# Patient Record
Sex: FEMALE | Race: AMERICAN INDIAN OR ALASKA NATIVE | Employment: UNEMPLOYED | URBAN - METROPOLITAN AREA
[De-identification: names, ages, dates, MRNs, and addresses within clinical notes are randomized per-mention and may not be internally consistent; named-entity substitution may affect disease eponyms.]

---

## 2017-12-23 ENCOUNTER — HOSPITAL ENCOUNTER (INPATIENT)
Facility: CLINIC | Age: 37
LOS: 3 days | Discharge: HOME | End: 2017-12-26
Attending: EMERGENCY MEDICINE | Admitting: PSYCHIATRY & NEUROLOGY
Payer: COMMERCIAL

## 2017-12-23 DIAGNOSIS — F10.20 UNCOMPLICATED ALCOHOL DEPENDENCE (H): ICD-10-CM

## 2017-12-23 DIAGNOSIS — G44.209 TENSION HEADACHE: Primary | ICD-10-CM

## 2017-12-23 DIAGNOSIS — K21.9 GASTROESOPHAGEAL REFLUX DISEASE WITHOUT ESOPHAGITIS: ICD-10-CM

## 2017-12-23 DIAGNOSIS — F41.1 GAD (GENERALIZED ANXIETY DISORDER): ICD-10-CM

## 2017-12-23 DIAGNOSIS — F13.20 BENZODIAZEPINE DEPENDENCE (H): ICD-10-CM

## 2017-12-23 DIAGNOSIS — M16.10 ARTHRITIS OF HIP: ICD-10-CM

## 2017-12-23 DIAGNOSIS — D50.8 IRON DEFICIENCY ANEMIA SECONDARY TO INADEQUATE DIETARY IRON INTAKE: ICD-10-CM

## 2017-12-23 PROBLEM — F19.10 POLYSUBSTANCE ABUSE (H): Status: ACTIVE | Noted: 2017-12-23

## 2017-12-23 LAB
ALCOHOL BREATH TEST: 0 (ref 0–0.01)
AMPHETAMINES UR QL SCN: NEGATIVE
BARBITURATES UR QL: NEGATIVE
BENZODIAZ UR QL: POSITIVE
CANNABINOIDS UR QL SCN: NEGATIVE
COCAINE UR QL: NEGATIVE
ETHANOL UR QL SCN: NEGATIVE
HCG UR QL: NEGATIVE
OPIATES UR QL SCN: NEGATIVE

## 2017-12-23 PROCEDURE — 99284 EMERGENCY DEPT VISIT MOD MDM: CPT | Mod: Z6 | Performed by: EMERGENCY MEDICINE

## 2017-12-23 PROCEDURE — 82075 ASSAY OF BREATH ETHANOL: CPT | Performed by: EMERGENCY MEDICINE

## 2017-12-23 PROCEDURE — 12800012 ZZH R&B CD MH INTERMEDIATE ADULT

## 2017-12-23 PROCEDURE — 80320 DRUG SCREEN QUANTALCOHOLS: CPT | Performed by: EMERGENCY MEDICINE

## 2017-12-23 PROCEDURE — 25000125 ZZHC RX 250: Performed by: PSYCHIATRY & NEUROLOGY

## 2017-12-23 PROCEDURE — 80307 DRUG TEST PRSMV CHEM ANLYZR: CPT | Performed by: EMERGENCY MEDICINE

## 2017-12-23 PROCEDURE — 81025 URINE PREGNANCY TEST: CPT | Performed by: EMERGENCY MEDICINE

## 2017-12-23 PROCEDURE — 25000132 ZZH RX MED GY IP 250 OP 250 PS 637: Performed by: PSYCHIATRY & NEUROLOGY

## 2017-12-23 PROCEDURE — 99285 EMERGENCY DEPT VISIT HI MDM: CPT | Performed by: EMERGENCY MEDICINE

## 2017-12-23 RX ORDER — ATENOLOL 50 MG/1
50 TABLET ORAL DAILY PRN
Status: DISCONTINUED | OUTPATIENT
Start: 2017-12-23 | End: 2017-12-26 | Stop reason: HOSPADM

## 2017-12-23 RX ORDER — ONDANSETRON 4 MG/1
4 TABLET, ORALLY DISINTEGRATING ORAL EVERY 6 HOURS PRN
Status: DISCONTINUED | OUTPATIENT
Start: 2017-12-23 | End: 2017-12-26 | Stop reason: HOSPADM

## 2017-12-23 RX ORDER — IBUPROFEN 600 MG/1
600 TABLET, FILM COATED ORAL EVERY 6 HOURS PRN
Status: DISCONTINUED | OUTPATIENT
Start: 2017-12-23 | End: 2017-12-26 | Stop reason: HOSPADM

## 2017-12-23 RX ORDER — PHENOBARBITAL 64.8 MG/1
64.8 TABLET ORAL 3 TIMES DAILY
Status: DISCONTINUED | OUTPATIENT
Start: 2017-12-23 | End: 2017-12-26 | Stop reason: HOSPADM

## 2017-12-23 RX ORDER — CITALOPRAM HYDROBROMIDE 20 MG/1
20 TABLET ORAL DAILY
Status: ON HOLD | COMMUNITY
End: 2017-12-26

## 2017-12-23 RX ORDER — ALUMINA, MAGNESIA, AND SIMETHICONE 2400; 2400; 240 MG/30ML; MG/30ML; MG/30ML
30 SUSPENSION ORAL EVERY 4 HOURS PRN
Status: DISCONTINUED | OUTPATIENT
Start: 2017-12-23 | End: 2017-12-26 | Stop reason: HOSPADM

## 2017-12-23 RX ORDER — GABAPENTIN 100 MG/1
100 CAPSULE ORAL 3 TIMES DAILY
Status: ON HOLD | COMMUNITY
End: 2017-12-26

## 2017-12-23 RX ORDER — LOPERAMIDE HCL 2 MG
2 CAPSULE ORAL 4 TIMES DAILY PRN
Status: DISCONTINUED | OUTPATIENT
Start: 2017-12-23 | End: 2017-12-26 | Stop reason: HOSPADM

## 2017-12-23 RX ORDER — MULTIPLE VITAMINS W/ MINERALS TAB 9MG-400MCG
1 TAB ORAL DAILY
Status: DISCONTINUED | OUTPATIENT
Start: 2017-12-24 | End: 2017-12-26 | Stop reason: HOSPADM

## 2017-12-23 RX ORDER — LANOLIN ALCOHOL/MO/W.PET/CERES
100 CREAM (GRAM) TOPICAL DAILY
Status: COMPLETED | OUTPATIENT
Start: 2017-12-24 | End: 2017-12-26

## 2017-12-23 RX ORDER — FERROUS SULFATE 325(65) MG
325 TABLET ORAL
Status: DISCONTINUED | OUTPATIENT
Start: 2017-12-24 | End: 2017-12-26 | Stop reason: HOSPADM

## 2017-12-23 RX ORDER — ACETAMINOPHEN 325 MG/1
650 TABLET ORAL EVERY 4 HOURS PRN
Status: DISCONTINUED | OUTPATIENT
Start: 2017-12-23 | End: 2017-12-26 | Stop reason: HOSPADM

## 2017-12-23 RX ORDER — DIAZEPAM 5 MG
5-20 TABLET ORAL EVERY 30 MIN PRN
Status: DISCONTINUED | OUTPATIENT
Start: 2017-12-23 | End: 2017-12-26 | Stop reason: HOSPADM

## 2017-12-23 RX ORDER — FOLIC ACID 1 MG/1
1 TABLET ORAL DAILY
Status: DISCONTINUED | OUTPATIENT
Start: 2017-12-24 | End: 2017-12-26 | Stop reason: HOSPADM

## 2017-12-23 RX ORDER — FERROUS SULFATE 325(65) MG
325 TABLET ORAL
Status: ON HOLD | COMMUNITY
End: 2017-12-26

## 2017-12-23 RX ORDER — BISACODYL 10 MG
10 SUPPOSITORY, RECTAL RECTAL DAILY PRN
Status: DISCONTINUED | OUTPATIENT
Start: 2017-12-23 | End: 2017-12-26 | Stop reason: HOSPADM

## 2017-12-23 RX ORDER — HYDROXYZINE HYDROCHLORIDE 25 MG/1
25-50 TABLET, FILM COATED ORAL EVERY 4 HOURS PRN
Status: DISCONTINUED | OUTPATIENT
Start: 2017-12-23 | End: 2017-12-26 | Stop reason: HOSPADM

## 2017-12-23 RX ORDER — TRAZODONE HYDROCHLORIDE 50 MG/1
50 TABLET, FILM COATED ORAL
Status: DISCONTINUED | OUTPATIENT
Start: 2017-12-23 | End: 2017-12-26 | Stop reason: HOSPADM

## 2017-12-23 RX ORDER — GABAPENTIN 100 MG/1
100 CAPSULE ORAL 3 TIMES DAILY
Status: DISCONTINUED | OUTPATIENT
Start: 2017-12-23 | End: 2017-12-26 | Stop reason: HOSPADM

## 2017-12-23 RX ADMIN — NICOTINE POLACRILEX 8 MG: 4 GUM, CHEWING ORAL at 20:36

## 2017-12-23 RX ADMIN — DICLOFENAC 2 G: 10 GEL TOPICAL at 20:38

## 2017-12-23 RX ADMIN — DIAZEPAM 10 MG: 5 TABLET ORAL at 20:36

## 2017-12-23 RX ADMIN — ALUMINUM HYDROXIDE, MAGNESIUM HYDROXIDE, AND DIMETHICONE 30 ML: 400; 400; 40 SUSPENSION ORAL at 17:19

## 2017-12-23 RX ADMIN — DIAZEPAM 10 MG: 5 TABLET ORAL at 17:19

## 2017-12-23 RX ADMIN — ONDANSETRON 4 MG: 4 TABLET, ORALLY DISINTEGRATING ORAL at 17:22

## 2017-12-23 RX ADMIN — NICOTINE POLACRILEX 8 MG: 4 GUM, CHEWING ORAL at 17:19

## 2017-12-23 RX ADMIN — GABAPENTIN 100 MG: 100 CAPSULE ORAL at 20:36

## 2017-12-23 RX ADMIN — PHENOBARBITAL 64.8 MG: 64.8 TABLET ORAL at 20:36

## 2017-12-23 ASSESSMENT — ENCOUNTER SYMPTOMS
VOMITING: 0
BACK PAIN: 0
SHORTNESS OF BREATH: 0
AGITATION: 0
POLYDIPSIA: 0
FEVER: 0
SEIZURES: 0
NECK STIFFNESS: 0
ABDOMINAL PAIN: 0
NECK PAIN: 0
NAUSEA: 0
COLOR CHANGE: 0

## 2017-12-23 ASSESSMENT — ACTIVITIES OF DAILY LIVING (ADL): GROOMING: INDEPENDENT

## 2017-12-23 NOTE — ED PROVIDER NOTES
[facility] EMERGENCY DEPARTMENT ([facility])    12/23/17       History     Chief Complaint   Patient presents with     Alcohol Problem     Alcohol and benzo withdrawl  Last drink at 12 MN last night.  Last benzo at 0600 today.     HPI  Viviana Massey is a 37 year old female with a medical history significant for substance abuse, depressive disorder and anxiety who presents to the Emergency Department seeking detox from alcohol and benzodiazepines.  Patient reports that she drinks approximately 750 mL of vodka daily with her last drink at 12 AM this morning.  She also reports that for the past 6 days she has been using approximately 3-5 mg of Xanax daily over the course of the day.  Her last use of Xanax was at 6 AM this morning.  She denies any history of withdrawal seizures.  She also reports intermittently using methamphetamine, though she reports quitting 1.5 weeks ago.  She also reports using cocaine up until a couple of weeks ago.  She denies any use of either since.  She denies any heroin use or marijuana use.  Of note, patient has a detox bed on hold for her.  She denies pain or fevers.    I have reviewed the Medications, Allergies, Past Medical and Surgical History, and Social History in the [facility] It In Person system.    Past Medical History:   Diagnosis Date     Anxiety      Depressive disorder      Insomnia      Substance abuse        Past Surgical History:   Procedure Laterality Date     GI SURGERY  2013    gastric bipass     GYN SURGERY      D and C.       No family history on file.    Social History   Substance Use Topics     Smoking status: Current Every Day Smoker     Packs/day: 1.00     Smokeless tobacco: Never Used     Alcohol use Yes      Comment: Amount of alcohol varies but 750 vodka per day.       No current facility-administered medications for this encounter.      Current Outpatient Prescriptions   Medication     citalopram (CELEXA) 20 MG tablet     gabapentin (NEURONTIN) 100 MG capsule      diclofenac (VOLTAREN) 1 % GEL topical gel     ferrous sulfate (IRON) 325 (65 FE) MG tablet        Allergies   Allergen Reactions     Imitrex [Sumatriptan] Other (See Comments)     Pain in jaw and shoulder. Flushing.     Topamax [Topiramate] GI Disturbance     Trazodone Other (See Comments)     Headache, dizziness.         Review of Systems   Constitutional: Negative for fever.   HENT: Negative for congestion.    Eyes: Negative for visual disturbance.   Respiratory: Negative for shortness of breath.    Cardiovascular: Negative for chest pain.   Gastrointestinal: Negative for abdominal pain, nausea and vomiting.   Endocrine: Negative for polydipsia and polyuria.   Musculoskeletal: Negative for back pain, neck pain and neck stiffness.   Skin: Negative for color change.   Neurological: Negative for seizures.   Psychiatric/Behavioral: Negative for agitation and behavioral problems.   All other systems reviewed and are negative.      Physical Exam   BP: 143/49  Pulse: 69  Temp: 97.3  F (36.3  C)  Resp: 18  Weight: 105 kg (231 lb 6 oz)  SpO2: 99 %      Physical Exam   Constitutional: She is oriented to person, place, and time. She appears well-developed and well-nourished. No distress.   HENT:   Head: Normocephalic and atraumatic.   Mouth/Throat: Oropharynx is clear and moist. No oropharyngeal exudate.   Eyes: Conjunctivae and EOM are normal. Pupils are equal, round, and reactive to light. No scleral icterus.   Neck: Normal range of motion.   Cardiovascular: Normal rate, normal heart sounds and intact distal pulses.    Pulmonary/Chest: Effort normal and breath sounds normal. No respiratory distress. She has no wheezes. She has no rales.   Abdominal: Soft. Bowel sounds are normal. There is no tenderness.   Musculoskeletal: Normal range of motion. She exhibits no edema or tenderness.   Neurological: She is alert and oriented to person, place, and time. No cranial nerve deficit. She exhibits normal muscle tone. Coordination  normal.   Skin: Skin is warm. No rash noted. She is not diaphoretic.   Psychiatric: She has a normal mood and affect. Her behavior is normal. Judgment and thought content normal.   No suicidal or homicidal ideations.   Nursing note and vitals reviewed.      ED Course   12:32 PM  The patient was seen and examined by Dionisio Tam MD in Room ED16.     ED Course     Procedures             Critical Care time:  none             Labs Ordered and Resulted from Time of ED Arrival Up to the Time of Departure from the ED   DRUG ABUSE SCREEN 6 CHEM DEP URINE ([facility]) - Abnormal; Notable for the following:        Result Value    Benzodiazepine Qual Urine Positive (*)     All other components within normal limits   ALCOHOL BREATH TEST POCT - Normal   HCG QUALITATIVE URINE            Assessments & Plan (with Medical Decision Making)   37-year-old woman presenting with willingness to check into detox for management of polysubstance abuse and dependence.  Patient's breathalyzed alcohol level is undetectable.  There are no obvious signs of acute withdrawal.  She does have a detox bed on hold for her.  She will be admitted to detox.    This part of the medical record was transcribed by Jack Orozco, Medical Scribe, from a dictation done by Dionisio Tam MD.       I have reviewed the nursing notes.    I have reviewed the findings, diagnosis, plan and need for follow up with the patient.    New Prescriptions    No medications on file       Final diagnoses:   Uncomplicated alcohol dependence (H)   Benzodiazepine dependence (H)     I, Jack Orozco, am serving as a trained medical scribe to document services personally performed by Dionisio Tam MD, based on the provider's statements to me.   Dionisio BERRIOS MD, was physically present and have reviewed and verified the accuracy of this note documented by Jack Orozco.    12/23/2017   [facility] EMERGENCY DEPARTMENT     Tila Tam MD  12/23/17 5993

## 2017-12-23 NOTE — PROGRESS NOTES
12/23/17 1641   Patient Belongings   Did you bring any home meds/supplements to the hospital?  Yes   Disposition of meds  Sent to security/pharmacy per site process   Patient Belongings other (see comments)   Disposition of Belongings Sent to security per site process;Other (see comment)   Belongings Search Yes   Clothing Search Yes   Second Staff Lorie    General Info Comment see note   Storage bin:(Patient has two storage bins) black backpack with toiletries, grey duffle bag with clothing. shoes, boots, black duffle bag with clothing, gift bag with toiletries, tobacco products, cigarette tubes, pjs and books. black purse with wallet, papers and other misc.personal items. Sharps: lighter, tools, nail clipper, tweezers, cigarettes, headphones  Locked Drawer: cell phone,   Security: [id number]: Meds  Security [id number]: 2 [place] ID, 2 visas, 2 Mastercards, EBT, [employer] card  A               Admission:  I am responsible for any personal items that are not sent to the safe or pharmacy.  [facility] is not responsible for loss, theft or damage of any property in my possession.    Signature:  _________________________________ Date: _______  Time: _____                                              Staff Signature:  ____________________________ Date: ________  Time: _____      2nd Staff person, if patient is unable/unwilling to sign:    Signature: ________________________________ Date: ________  Time: _____     Discharge:  [facility] has returned all of my personal belongings:    Signature: _________________________________ Date: ________  Time: _____                                          Staff Signature:  ____________________________ Date: ________  Time: _____

## 2017-12-23 NOTE — ED AVS SNAPSHOT
[facility], Emergency Department    [address]    [place] [address]    Phone:  [phone]    Fax:  [phone]                                       Viviana Massey   MRN: [id number]    Department:  [facility], Emergency Department   Date of Visit:  12/23/2017           After Visit Summary Signature Page     I have received my discharge instructions, and my questions have been answered. I have discussed any challenges I see with this plan with the nurse or doctor.    ..........................................................................................................................................  Patient/Patient Representative Signature      ..........................................................................................................................................  Patient Representative Print Name and Relationship to Patient    ..................................................               ................................................  Date                                            Time    ..........................................................................................................................................  Reviewed by Signature/Title    ...................................................              ..............................................  Date                                                            Time

## 2017-12-23 NOTE — PROGRESS NOTES
This is a 36 yo  woman admitted to [facility] due to alcohol and xanax abuse  The pt states she drinks 750 ml qd x 2 weeks.  DB was 12 midnight.  Denies seizures states she does have hallucinations when withdrawing  She also admits to using xanax 3-5 mg qd x 6 days.  DB was this am  Denies other drug use or IV use  The pt has [place] [facility] [place]  She has been in 3 Outpatient treatments   She has been in residential treatment in 2008 at the [facility] for 6 months  The pt has had 1 prior detox in [place] in 2007  The pts stressors are her inability to stop using, financial problems and is unable to hold down a job  Denies SI has never made an attempt  Cooperative with the interview

## 2017-12-23 NOTE — PHARMACY-ADMISSION MEDICATION HISTORY
Admission Medication History status for the 12/23/2017 admission is complete.  See EPIC admission navigator for Prior to Admission medications.    Medication history sources:  Patient, [facility] ([phone])     Medication history source reliability: Good. Patient could describe all medications and how she was taking them. Strengths were clarified with pharmacy.     Medication adherence:  Moderate. Patient misses doses of iron and has not yet started the citalopram.     Changes made to PTA medication list (reason)  Added:   -citalopram per patient/pharmacy.  -gabapentin per patient/pharmacy.  -iron per patient/pharmacy.  -diclofenac per patient/pharmacy.  Deleted: None  Changed: None    Additional medication history information (including reliability of information, actions taken by pharmacist):   -Patient reports she was supposed to start citalopram last Tuesday 12/12/17 but has not started yet. Per pharmacy, patient picked up citalopram on 12/13/17.  -Patient reports she ran out of gabapentin and has been taking 1 tablet three times daily. Per pharmacy, patient last picked up gabapentin on 12/13/17 for 90 tablets. Patient should not have run out yet if taking as prescribed.   -Patient reports she is using diclofenac topical for hip pain. She reports also using tylenol or ibuprofen but not consistently.    Time spent in this activity: 20 minutes    Medication history completed by: Bethany Head, [facility] Pharmacy Intern    Prior to Admission medications    Medication Sig Last Dose Taking? Auth Provider   citalopram (CELEXA) 20 MG tablet Take 20 mg by mouth daily  at Not yet started Yes Unknown, Entered By History   gabapentin (NEURONTIN) 100 MG capsule Take 100 mg by mouth 3 times daily 12/22/2017 at Unknown time Yes Unknown, Entered By History   diclofenac (VOLTAREN) 1 % GEL topical gel Apply topically 2 times daily Past Week at Unknown time Yes Unknown, Entered By History   ferrous  sulfate (IRON) 325 (65 FE) MG tablet Take 325 mg by mouth daily (with breakfast) Past Month at Unknown time Yes Unknown, Entered By History

## 2017-12-23 NOTE — IP AVS SNAPSHOT
MRN:[id number]                      After Visit Summary   12/23/2017    Viviana Massey    MRN: [id number]           Thank you!     Thank you for choosing [facility] for your care. Our goal is always to provide you with excellent care.        Patient Information     Date Of Birth          1980        Designated Caregiver       Most Recent Value    Caregiver    Will someone help with your care after discharge? no      About your hospital stay     You were admitted on:  December 23, 2017 You last received care in the: [facility]    You were discharged on:  December 26, 2017       Who to Call     For medical emergencies, please call 191.  For non-urgent questions about your medical care, please call your primary care provider or clinic, [phone]          Attending Provider     Provider Specialty    Tila Tam MD Emergency Medicine    Gildardo Bob MD Psychiatry       Primary Care Provider Office Phone # Fax #    Tracey Lizama [phone]      Follow-up Appointments     Follow Up ([facility])       Follow up with primary care provider, Tracey Lizama, within 7 days for hospital follow- up.  The following labs/tests are recommended: CBC.      Appointments on [facility] and/or [facility] (with [facility] or [facility] provider or service). Call [phone] if you haven't heard regarding these appointments within 7 days of discharge.                  Further instructions from your care team       Behavioral Discharge Planning and Instructions  THANK YOU FOR CHOOSING THE [facility]  3A-W  [phone]    Summary: You were admitted to and processed through [facility] on 12/23/2017 for alcohol and sedative hypnotic dependence   You are being discharged to the [place] facility.  Please take care and make your recovery a priority, Miss Massey.    Recommendation: 28 days inpatient residential treatment with aftercare.    Main  Diagnosis:  Alcohol and Benzodiazepinependence    Major Treatments, Procedures and Findings:  You received medical treatment for the symptoms of alcohol and benzodiazepine  withdrawal. A medical exam was performed that included lab work. You have met with a .       Symptoms to Report:  If you experience more anxiety, confusion, sleeplessness, deep sadness or thoughts of suicide, notify your treatment team or notify your primary care physician. IF ANY OF THE SYMPTOMS YOU ARE EXPERIENCING ARE A MEDICAL EMERGENCY CALL 911 IMMEDIATELY.     Lifestyle Adjustment: Adjust your lifestyle to get enough sleep, relaxation, exercise and  good nutrition. Continue to develop healthy coping skills to decrease stress and promote a sober living environment. Do not use alcohol, illegal drugs or addictive medications other than what is currently prescribed. AA, NA, and  Sponsor are good resources for support.     Treatment Follow-Up: [facility] [address], [place] [address]  [phone]  Patient states that she will follow up there, as needed, for future medical concerns.    Resources:   [facility] [phone]   Support Group:  AA/NA and Sponsor/support  Crisis Intervention: 329.618.8575 or 025-552-8845 (TTY: 219.226.4964).  Call anytime for help.  National [place] on Mental Illness (www.[place].liliya.org): 994.487.6452 or 346-848-5156.  Alcoholics Anonymous (www.alcoholics-anonymous.org): Check your phone book for your local chapter.  Suicide Awareness Voices of Education (SAVE) (www.save.org): 387-539-ZNPE (5351)  National Suicide Prevention Line (www.mentalhealthmn.org): 367-701-SGWJ (4846)  Mental Health Consumer/Survivor Network of [place] (www.mhcsn.net): 916.286.5638 or 008-392-1229  Mental Health Association of [place] (www.mentalhealth.org): 771.778.8737 or 567-411-9055   Substance Abuse and Mental Health Services (www.samhsa.gov)    [place] Recovery Connection (MRC)  [facility] connects people  "seeking recovery to resources that help foster and sustain long-term recovery.  Whether you are seeking resources for treatment, transportation, housing, job training, education, health care or other pathways to recovery, [facility] is a great place to start.  634.368.8931.  Www.[facility].org    General Medication Instructions:   See your medication sheet(s) for instructions.   Take all medicines as directed.  Make no changes unless your doctor suggests them.     Please Note:   If you have any questions at anytime after you are discharged please call the [facility] [place] detox unit 3A at [phone].  Please take this discharge folder with you to all your follow up appointments, it contains your lab results, diagnosis, medication list and discharge recommendations.       Pending Results     No orders found from 12/21/2017 to 12/24/2017.            Statement of Approval     Ordered          12/26/17 0858  I have reviewed and agree with all the recommendations and orders detailed in this document.  EFFECTIVE NOW     Comments:  Discharge today, please send dc meds and labs to her mds   Approved and electronically signed by:  Gildardo Bob MD             Admission Information     Date & Time Department Dept. Phone    12/23/2017 [facility] [phone]      Your Vitals Were     Blood Pressure Pulse Temperature Respirations Weight Last Period    113/75 58 97.9  F (36.6  C) (Oral) 16 105 kg (231 lb 6 oz) 12/23/2017    Pulse Oximetry                   97%           MyChart Information     [employer] lets you send messages to your doctor, view your test results, renew your prescriptions, schedule appointments and more. To sign up, go to [email or web address] . Click on \"Log in\" on the left side of the screen, which will take you to the Welcome page. Then click on \"Sign up Now\" on the right side of the page.     You will be asked to enter the access code listed " below, as well as some personal information. Please follow the directions to create your username and password.     Your access code is: [email or web address]  Expires: 3/23/2018  1:13 PM     Your access code will  in 90 days. If you need help or a new code, please call your [place] clinic or [phone].        Care EveryWhere ID     This is your Care EveryWhere ID. This could be used by other organizations to access your [place] medical records  [id number]        Equal Access to Services     [facility] Hospital[facility] : Hadii keshav woo hadayalao Soomaali, waaxda luqadaha, qaybta kaalmada david, erasto rosa . So [facility] [phone].    ATENCIÓN: Si habla español, tiene a aviles disposición servicios gratuitos de asistencia lingüística. Llame al [phone].    We comply with applicable federal civil rights laws and [place] laws. We do not discriminate on the basis of race, color, national origin, age, disability, sex, sexual orientation, or gender identity.               Review of your medicines      START taking        Dose / Directions    acetaminophen 325 MG tablet   Commonly known as:  TYLENOL   Used for:  Tension headache        Dose:  650 mg   Take 2 tablets (650 mg) by mouth every 4 hours as needed for mild pain   Quantity:  100 tablet   Refills:  3       buPROPion 150 MG 12 hr tablet   Commonly known as:  WELLBUTRIN SR   Used for:  Uncomplicated alcohol dependence (H)        Dose:  150 mg   Take 1 tablet (150 mg) by mouth 2 times daily   Quantity:  180 tablet   Refills:  3       escitalopram 10 MG tablet   Commonly known as:  LEXAPRO   Used for:  KELLI (generalized anxiety disorder)        Dose:  10 mg   Start taking on:  2017   Take 1 tablet (10 mg) by mouth daily   Quantity:  30 tablet   Refills:  3       folic acid 1 MG tablet   Commonly known as:  FOLVITE   Used for:  Uncomplicated alcohol dependence (H)        Dose:  1 mg   Start taking on:  2017   Take 1 tablet (1 mg) by mouth  daily   Quantity:  90 tablet   Refills:  3       hydrOXYzine 25 MG tablet   Commonly known as:  ATARAX   Used for:  KELLI (generalized anxiety disorder)        Dose:  25-50 mg   Take 1-2 tablets (25-50 mg) by mouth every 4 hours as needed for anxiety   Quantity:  120 tablet   Refills:  3       ibuprofen 600 MG tablet   Commonly known as:  ADVIL/MOTRIN   Used for:  Tension headache        Dose:  600 mg   Take 1 tablet (600 mg) by mouth every 6 hours as needed for moderate pain   Quantity:  120 tablet   Refills:  3       multivitamin, therapeutic with minerals Tabs tablet   Used for:  Uncomplicated alcohol dependence (H)        Dose:  1 tablet   Start taking on:  12/27/2017   Take 1 tablet by mouth daily   Quantity:  90 each   Refills:  3       naltrexone 50 MG tablet   Commonly known as:  DEPADE;REVIA   Used for:  Uncomplicated alcohol dependence (H)        Dose:  50 mg   Take 1 tablet (50 mg) by mouth daily   Quantity:  90 tablet   Refills:  3       omeprazole 20 MG CR capsule   Commonly known as:  priLOSEC   Used for:  Gastroesophageal reflux disease without esophagitis        Dose:  20 mg   Start taking on:  12/27/2017   Take 1 capsule (20 mg) by mouth every morning (before breakfast)   Quantity:  30 capsule   Refills:  3       PHENobarbital 32.4 MG Tabs tablet   Commonly known as:  LUMINAL   Used for:  Benzodiazepine dependence (H)        Take 5 daily for one day, then 4 daily for one day, then 3 daily for one day, then one twice/day for one day, then one on the last day   Quantity:  15 tablet   Refills:  0         CONTINUE these medicines which may have CHANGED, or have new prescriptions. If we are uncertain of the size of tablets/capsules you have at home, strength may be listed as something that might have changed.        Dose / Directions    diclofenac 1 % Gel topical gel   Commonly known as:  VOLTAREN   This may have changed:  how much to take   Used for:  Arthritis of hip        Dose:  2 g   Apply 2 g  topically 2 times daily   Quantity:  100 g   Refills:  3         CONTINUE these medicines which have NOT CHANGED        Dose / Directions    ferrous sulfate 325 (65 FE) MG tablet   Commonly known as:  IRON   Used for:  Iron deficiency anemia secondary to inadequate dietary iron intake        Dose:  325 mg   Start taking on:  12/27/2017   Take 1 tablet (325 mg) by mouth daily (with breakfast)   Quantity:  100 tablet   Refills:  3       gabapentin 100 MG capsule   Commonly known as:  NEURONTIN   Used for:  Arthritis of hip        Dose:  100 mg   Take 1 capsule (100 mg) by mouth 3 times daily   Quantity:  90 capsule   Refills:  3         STOP taking     citalopram 20 MG tablet   Commonly known as:  celeXA                Where to get your medicines      These medications were sent to [facility] [place], [place] - [address]  [address] [address]     Phone:  [phone]     acetaminophen 325 MG tablet    buPROPion 150 MG 12 hr tablet    diclofenac 1 % Gel topical gel    escitalopram 10 MG tablet    ferrous sulfate 325 (65 FE) MG tablet    folic acid 1 MG tablet    gabapentin 100 MG capsule    hydrOXYzine 25 MG tablet    ibuprofen 600 MG tablet    multivitamin, therapeutic with minerals Tabs tablet    naltrexone 50 MG tablet    omeprazole 20 MG CR capsule         Some of these will need a paper prescription and others can be bought over the counter. Ask your nurse if you have questions.     Bring a paper prescription for each of these medications     PHENobarbital 32.4 MG Tabs tablet                Protect others around you: Learn how to safely use, store and throw away your medicines at www.disposemymeds.org.             Medication List: This is a list of all your medications and when to take them. Check marks below indicate your daily home schedule. Keep this list as a reference.      Medications           Morning Afternoon Evening Bedtime As Needed    acetaminophen 325 MG  tablet   Commonly known as:  TYLENOL   Take 2 tablets (650 mg) by mouth every 4 hours as needed for mild pain   Last time this was given:  650 mg on 12/25/2017  8:19 PM                                buPROPion 150 MG 12 hr tablet   Commonly known as:  WELLBUTRIN SR   Take 1 tablet (150 mg) by mouth 2 times daily                                diclofenac 1 % Gel topical gel   Commonly known as:  VOLTAREN   Apply 2 g topically 2 times daily   Last time this was given:  2 g on 12/25/2017  8:18 PM                                escitalopram 10 MG tablet   Commonly known as:  LEXAPRO   Take 1 tablet (10 mg) by mouth daily   Start taking on:  12/27/2017   Last time this was given:  10 mg on 12/26/2017  7:56 AM                                ferrous sulfate 325 (65 FE) MG tablet   Commonly known as:  IRON   Take 1 tablet (325 mg) by mouth daily (with breakfast)   Start taking on:  12/27/2017   Last time this was given:  325 mg on 12/26/2017  7:56 AM                                folic acid 1 MG tablet   Commonly known as:  FOLVITE   Take 1 tablet (1 mg) by mouth daily   Start taking on:  12/27/2017   Last time this was given:  1 mg on 12/26/2017  7:56 AM                                gabapentin 100 MG capsule   Commonly known as:  NEURONTIN   Take 1 capsule (100 mg) by mouth 3 times daily   Last time this was given:  100 mg on 12/26/2017  1:00 PM                                hydrOXYzine 25 MG tablet   Commonly known as:  ATARAX   Take 1-2 tablets (25-50 mg) by mouth every 4 hours as needed for anxiety   Last time this was given:  50 mg on 12/25/2017  4:26 PM                                ibuprofen 600 MG tablet   Commonly known as:  ADVIL/MOTRIN   Take 1 tablet (600 mg) by mouth every 6 hours as needed for moderate pain                                multivitamin, therapeutic with minerals Tabs tablet   Take 1 tablet by mouth daily   Start taking on:  12/27/2017   Last time this was given:  1 tablet on 12/26/2017   7:56 AM                                naltrexone 50 MG tablet   Commonly known as:  DEPADE;REVIA   Take 1 tablet (50 mg) by mouth daily                                omeprazole 20 MG CR capsule   Commonly known as:  priLOSEC   Take 1 capsule (20 mg) by mouth every morning (before breakfast)   Start taking on:  12/27/2017   Last time this was given:  20 mg on 12/26/2017  7:56 AM                                PHENobarbital 32.4 MG Tabs tablet   Commonly known as:  LUMINAL   Take 5 daily for one day, then 4 daily for one day, then 3 daily for one day, then one twice/day for one day, then one on the last day   Last time this was given:  64.8 mg on 12/26/2017  1:00 PM

## 2017-12-23 NOTE — ED NOTES
Lives with brother and his wife. Not employed. Sleeping and eating. To start [place] 3 days from now.

## 2017-12-23 NOTE — IP AVS SNAPSHOT
[facility]    [address] [place] [address]    Phone:  [phone]                                       After Visit Summary   12/23/2017    Viviana Massey    MRN: [id number]           After Visit Summary Signature Page     I have received my discharge instructions, and my questions have been answered. I have discussed any challenges I see with this plan with the nurse or doctor.    ..........................................................................................................................................  Patient/Patient Representative Signature      ..........................................................................................................................................  Patient Representative Print Name and Relationship to Patient    ..................................................               ................................................  Date                                            Time    ..........................................................................................................................................  Reviewed by Signature/Title    ...................................................              ..............................................  Date                                                            Time

## 2017-12-24 LAB
ALBUMIN SERPL-MCNC: 2.9 G/DL (ref 3.4–5)
ALP SERPL-CCNC: 64 U/L (ref 40–150)
ALT SERPL W P-5'-P-CCNC: 17 U/L (ref 0–50)
ANION GAP SERPL CALCULATED.3IONS-SCNC: 7 MMOL/L (ref 3–14)
AST SERPL W P-5'-P-CCNC: 21 U/L (ref 0–45)
BASOPHILS # BLD AUTO: 0.1 10E9/L (ref 0–0.2)
BASOPHILS NFR BLD AUTO: 1.1 %
BILIRUB SERPL-MCNC: 0.5 MG/DL (ref 0.2–1.3)
BUN SERPL-MCNC: 8 MG/DL (ref 7–30)
CALCIUM SERPL-MCNC: 8 MG/DL (ref 8.5–10.1)
CHLORIDE SERPL-SCNC: 111 MMOL/L (ref 94–109)
CO2 SERPL-SCNC: 23 MMOL/L (ref 20–32)
CREAT SERPL-MCNC: 0.58 MG/DL (ref 0.52–1.04)
DIFFERENTIAL METHOD BLD: ABNORMAL
EOSINOPHIL # BLD AUTO: 0.2 10E9/L (ref 0–0.7)
EOSINOPHIL NFR BLD AUTO: 4.5 %
ERYTHROCYTE [DISTWIDTH] IN BLOOD BY AUTOMATED COUNT: 16.3 % (ref 10–15)
GFR SERPL CREATININE-BSD FRML MDRD: >90 ML/MIN/1.7M2
GGT SERPL-CCNC: 11 U/L (ref 0–40)
GLUCOSE SERPL-MCNC: 92 MG/DL (ref 70–99)
HCT VFR BLD AUTO: 33.5 % (ref 35–47)
HGB BLD-MCNC: 10.2 G/DL (ref 11.7–15.7)
IMM GRANULOCYTES # BLD: 0 10E9/L (ref 0–0.4)
IMM GRANULOCYTES NFR BLD: 0.2 %
LYMPHOCYTES # BLD AUTO: 1.4 10E9/L (ref 0.8–5.3)
LYMPHOCYTES NFR BLD AUTO: 29 %
MCH RBC QN AUTO: 24.9 PG (ref 26.5–33)
MCHC RBC AUTO-ENTMCNC: 30.4 G/DL (ref 31.5–36.5)
MCV RBC AUTO: 82 FL (ref 78–100)
MONOCYTES # BLD AUTO: 0.3 10E9/L (ref 0–1.3)
MONOCYTES NFR BLD AUTO: 6.9 %
NEUTROPHILS # BLD AUTO: 2.7 10E9/L (ref 1.6–8.3)
NEUTROPHILS NFR BLD AUTO: 58.3 %
NRBC # BLD AUTO: 0 10*3/UL
NRBC BLD AUTO-RTO: 0 /100
PLATELET # BLD AUTO: 402 10E9/L (ref 150–450)
POTASSIUM SERPL-SCNC: 4.4 MMOL/L (ref 3.4–5.3)
PROT SERPL-MCNC: 6.7 G/DL (ref 6.8–8.8)
RBC # BLD AUTO: 4.1 10E12/L (ref 3.8–5.2)
SODIUM SERPL-SCNC: 141 MMOL/L (ref 133–144)
TSH SERPL DL<=0.005 MIU/L-ACNC: 1.11 MU/L (ref 0.4–4)
WBC # BLD AUTO: 4.7 10E9/L (ref 4–11)

## 2017-12-24 PROCEDURE — 82977 ASSAY OF GGT: CPT | Performed by: PSYCHIATRY & NEUROLOGY

## 2017-12-24 PROCEDURE — 12800012 ZZH R&B CD MH INTERMEDIATE ADULT

## 2017-12-24 PROCEDURE — HZ2ZZZZ DETOXIFICATION SERVICES FOR SUBSTANCE ABUSE TREATMENT: ICD-10-PCS | Performed by: PSYCHIATRY & NEUROLOGY

## 2017-12-24 PROCEDURE — 25000132 ZZH RX MED GY IP 250 OP 250 PS 637: Performed by: PSYCHIATRY & NEUROLOGY

## 2017-12-24 PROCEDURE — 99207 ZZC DOWN CODE DUE TO INITIAL EXAM: CPT | Performed by: PSYCHIATRY & NEUROLOGY

## 2017-12-24 PROCEDURE — 82306 VITAMIN D 25 HYDROXY: CPT | Performed by: PSYCHIATRY & NEUROLOGY

## 2017-12-24 PROCEDURE — 99207 ZZC CONSULT E&M CHANGED TO INITIAL LEVEL: CPT | Performed by: PHYSICIAN ASSISTANT

## 2017-12-24 PROCEDURE — 36415 COLL VENOUS BLD VENIPUNCTURE: CPT | Performed by: PSYCHIATRY & NEUROLOGY

## 2017-12-24 PROCEDURE — 80053 COMPREHEN METABOLIC PANEL: CPT | Performed by: PSYCHIATRY & NEUROLOGY

## 2017-12-24 PROCEDURE — 99221 1ST HOSP IP/OBS SF/LOW 40: CPT | Mod: AI | Performed by: PSYCHIATRY & NEUROLOGY

## 2017-12-24 PROCEDURE — 85025 COMPLETE CBC W/AUTO DIFF WBC: CPT | Performed by: PSYCHIATRY & NEUROLOGY

## 2017-12-24 PROCEDURE — 84443 ASSAY THYROID STIM HORMONE: CPT | Performed by: PSYCHIATRY & NEUROLOGY

## 2017-12-24 PROCEDURE — 99222 1ST HOSP IP/OBS MODERATE 55: CPT | Performed by: PHYSICIAN ASSISTANT

## 2017-12-24 PROCEDURE — 25000132 ZZH RX MED GY IP 250 OP 250 PS 637: Performed by: PHYSICIAN ASSISTANT

## 2017-12-24 PROCEDURE — 25000125 ZZHC RX 250: Performed by: PSYCHIATRY & NEUROLOGY

## 2017-12-24 RX ORDER — ESCITALOPRAM OXALATE 10 MG/1
10 TABLET ORAL DAILY
Status: DISCONTINUED | OUTPATIENT
Start: 2017-12-24 | End: 2017-12-26 | Stop reason: HOSPADM

## 2017-12-24 RX ADMIN — DIAZEPAM 10 MG: 5 TABLET ORAL at 16:31

## 2017-12-24 RX ADMIN — FOLIC ACID 1 MG: 1 TABLET ORAL at 08:13

## 2017-12-24 RX ADMIN — DICLOFENAC 2 G: 10 GEL TOPICAL at 20:26

## 2017-12-24 RX ADMIN — GABAPENTIN 100 MG: 100 CAPSULE ORAL at 20:22

## 2017-12-24 RX ADMIN — NICOTINE POLACRILEX 8 MG: 4 GUM, CHEWING ORAL at 16:31

## 2017-12-24 RX ADMIN — MULTIPLE VITAMINS W/ MINERALS TAB 1 TABLET: TAB at 08:13

## 2017-12-24 RX ADMIN — DIAZEPAM 10 MG: 5 TABLET ORAL at 08:13

## 2017-12-24 RX ADMIN — ALUMINUM HYDROXIDE, MAGNESIUM HYDROXIDE, AND DIMETHICONE 30 ML: 400; 400; 40 SUSPENSION ORAL at 16:30

## 2017-12-24 RX ADMIN — PHENOBARBITAL 64.8 MG: 64.8 TABLET ORAL at 14:07

## 2017-12-24 RX ADMIN — DIAZEPAM 10 MG: 5 TABLET ORAL at 20:22

## 2017-12-24 RX ADMIN — ESCITALOPRAM OXALATE 10 MG: 10 TABLET ORAL at 18:26

## 2017-12-24 RX ADMIN — ONDANSETRON 4 MG: 4 TABLET, ORALLY DISINTEGRATING ORAL at 14:08

## 2017-12-24 RX ADMIN — NICOTINE POLACRILEX 8 MG: 4 GUM, CHEWING ORAL at 18:26

## 2017-12-24 RX ADMIN — OMEPRAZOLE 20 MG: 20 CAPSULE, DELAYED RELEASE ORAL at 12:42

## 2017-12-24 RX ADMIN — NICOTINE POLACRILEX 8 MG: 4 GUM, CHEWING ORAL at 20:25

## 2017-12-24 RX ADMIN — ALUMINUM HYDROXIDE, MAGNESIUM HYDROXIDE, AND DIMETHICONE 30 ML: 400; 400; 40 SUSPENSION ORAL at 20:22

## 2017-12-24 RX ADMIN — NICOTINE POLACRILEX 8 MG: 4 GUM, CHEWING ORAL at 08:32

## 2017-12-24 RX ADMIN — GABAPENTIN 100 MG: 100 CAPSULE ORAL at 08:14

## 2017-12-24 RX ADMIN — FERROUS SULFATE TAB 325 MG (65 MG ELEMENTAL FE) 325 MG: 325 (65 FE) TAB at 08:13

## 2017-12-24 RX ADMIN — ONDANSETRON 4 MG: 4 TABLET, ORALLY DISINTEGRATING ORAL at 08:17

## 2017-12-24 RX ADMIN — Medication 100 MG: at 08:13

## 2017-12-24 RX ADMIN — PHENOBARBITAL 64.8 MG: 64.8 TABLET ORAL at 08:13

## 2017-12-24 RX ADMIN — PHENOBARBITAL 64.8 MG: 64.8 TABLET ORAL at 20:22

## 2017-12-24 RX ADMIN — DICLOFENAC 2 G: 10 GEL TOPICAL at 08:14

## 2017-12-24 RX ADMIN — NICOTINE POLACRILEX 8 MG: 4 GUM, CHEWING ORAL at 12:42

## 2017-12-24 RX ADMIN — GABAPENTIN 100 MG: 100 CAPSULE ORAL at 14:07

## 2017-12-24 RX ADMIN — NICOTINE POLACRILEX 8 MG: 4 GUM, CHEWING ORAL at 14:08

## 2017-12-24 ASSESSMENT — ACTIVITIES OF DAILY LIVING (ADL)
ORAL_HYGIENE: INDEPENDENT
GROOMING: INDEPENDENT
DRESS: INDEPENDENT

## 2017-12-24 NOTE — CONSULTS
Internal Medicine Initial Visit    Viviana Massey MRN# [id number]   Age: 37 year old YOB: 1980   Date of Admission: 12/23/2017     Reason for consult: Normocytic Anemia, GERD       Requesting physician Dr. Swapnil Lowe      SUBJECTIVE   HPI:   Viviana Massey is a 37 year old female with history of depression, anxiety, insomnia, and substance abuse admitted to station 3A for acute alcohol and benzodiazepine withdrawal. Medicine was consulted to co-manage patient's anemia and GERD.    Patient presented to the ED 12/23 voluntarily seeking withdrawal from alcohol and benzodiazepines. Reports drinking ~750 cc's of hard liquor and using 3-5 mg of Xanax daily. Endorses history of DT's from withdrawals, although denies history of withdrawal seizures.    Currently, patient is feeling pretty good from a withdrawal standpoint. Endorses some mild headache, dizziness, and fatigue. Notes her shakiness and nausea has improved from initial presentation. Complains of epigastric pain and heartburn over the past 2 months. Took some mylanta last night which helped alleviate her symptoms. Interested in starting something like omeprazole for acid suppression. Notes intermittent diarrhea and constipation which is patient's baseline (history of gastric bypass). Otherwise patient denies fevers, chills, chest pain, SOB, cough, vomiting, dysuria, or peripheral edema.     Past Medical History:     Past Medical History:   Diagnosis Date     Anxiety      Depressive disorder      Insomnia      Substance abuse       Reviewed & updated in [facility].     Past Surgical History:      Past Surgical History:   Procedure Laterality Date     GI SURGERY  2013    gastric bipass     GYN SURGERY      D and C.      Reviewed & updated in Epic.     Medications prior to admission:     Prior to Admission Medications   Prescriptions Last Dose Informant Patient Reported? Taking?   citalopram (CELEXA) 20 MG tablet  at Not yet started Pharmacy Yes  Yes   Sig: Take 20 mg by mouth daily   diclofenac (VOLTAREN) 1 % GEL topical gel Past Week at Unknown time Pharmacy Yes Yes   Sig: Apply topically 2 times daily   ferrous sulfate (IRON) 325 (65 FE) MG tablet Past Month at Unknown time Self Yes Yes   Sig: Take 325 mg by mouth daily (with breakfast)   gabapentin (NEURONTIN) 100 MG capsule 12/22/2017 at Unknown time Pharmacy Yes Yes   Sig: Take 100 mg by mouth 3 times daily      Facility-Administered Medications: None         Allergies:     Allergies   Allergen Reactions     Imitrex [Sumatriptan] Other (See Comments)     Pain in jaw and shoulder. Flushing.     Topamax [Topiramate] GI Disturbance     Trazodone Other (See Comments)     Headache, dizziness.         Social History:   Tobacco Use: Smokes ~1 ppd  Alcohol Use: Reports drinking 750 mL of hard liquor daily over past 2 weeks  Illicit Drug Use: Uses 3-5 mg of benzo's daily, intermittently smokes meth, denies IVDU  STI Testing: Declines at this time     Family History:     Family History   Problem Relation Age of Onset     Esophageal Cancer Father      CANCER Maternal Grandmother      Lymphoma Maternal Uncle      CANCER Paternal Grandfather         Reviewed & updated in Epic.     Review of Systems:   Ten point review of systems negative except as stated above in History of Present Illness.    OBJECTIVE   Physical Exam:   Blood pressure 111/76, pulse 63, temperature 98  F (36.7  C), temperature source Tympanic, resp. rate 16, weight 105 kg (231 lb 6 oz), last menstrual period 12/23/2017, SpO2 99 %, not currently breastfeeding.  General: Well-appearing  female sitting upright in chair, NAD.  HEENT: NC/AT. Anicteric sclera. Eyes symmetric and free of discharge bilaterally. Mucous membranes moist.  Neck: Supple  Cardiovascular: RRR. S1/S2. No murmurs appreciated.  Lungs: Normal respiratory effort on RA. Lungs CTAB without wheezes, rales, or rhonchi.  Abdomen: Soft, non-distended, mild epigastric  tenderness. Bowel sounds normoactive.  Extremities: Warm & well perfused. No peripheral edema.  Skin: No rashes, jaundice, or lesions on exposed areas of skin.  Neurologic: A&O X 3. Answers questions appropriately. Moves all extremities symmetrically.     Laboratory Data:   CMP    Recent Labs  Lab 12/24/17  0730      POTASSIUM 4.4   CHLORIDE 111*   CO2 23   ANIONGAP 7   GLC 92   BUN 8   CR 0.58   GFRESTIMATED >90   GFRESTBLACK >90   LASHELL 8.0*   PROTTOTAL 6.7*   ALBUMIN 2.9*   BILITOTAL 0.5   ALKPHOS 64   AST 21   ALT 17       CBC    Recent Labs  Lab 12/24/17  0730   WBC 4.7   RBC 4.10   HGB 10.2*   HCT 33.5*   MCV 82   MCH 24.9*   MCHC 30.4*   RDW 16.3*          TSH    Recent Labs  Lab 12/24/17  0730   TSH 1.11          Assessment and Plan/Recommendations:   Viviana Massey is a 37 year old female with history of depression, anxiety, insomnia, and substance abuse admitted to station 3A for acute alcohol and benzodiazepine withdrawal.     Depression, Anxiety, Insomnia, Acute Alcohol and Benzodiazepine Withdrawal. Reports drinking 750 mL hard liquor and taking 3-5 mg of Xanax daily over past ~2 weeks. MAHNAZ 0.00 in ED. U tox + benzodiazepines. On MSSA protocol.  - Continue MVI, thiamine, folate  - Management per Psychiatry    Normocytic Anemia. Hgb 10.2, MCV 82 on admission. Unclear what patient's baseline is as no other values on file, although patient reports chronic history of anemia. Has not been consistently taking iron supplement PTA. No acute s/s of bleeding on exam. Would recommend following up with PCP for repeat CBC within 1 week of discharge.  - Continue ferrous sulfate 325 mg QD  - Follow up with PCP within 1 week for repeat CBC (order placed)    GERD. Complains of persistent abdominal pain, heartburn over past 2 months. Reports relief with PRN mylanta. Will start scheduled PPI for symptomatic control.  - Start omeprazole 20 mg QAM  - Continue PRN mylanta    Medicine will sign off at this time.  Please page the Internal Medicine job code pager for any intercurrent medical issues which arise. Thank you for the opportunity to be a part of this patient's care.    Elina Albright PA-C  Hospitalist Service  [phone]

## 2017-12-24 NOTE — PROGRESS NOTES
Case Management Note  12/24/2017    Writer met with patient to initiate discharge planning. Pt reports that she completed a Rule 25 back on 12/17 with [facility] and was approved for [facility] already. Pt reports working with an [facility] (Admission Sup) and that they are holding a bed for her until Wed. Reports that they can take her as early as Tue. She reports already signing an JAMEEL for [facility] for coordination of care with [facility]. Treatment/discharge planning continues.     Sergio Cole MA, EVELIN GRAHAM, Psychotherapist

## 2017-12-25 PROCEDURE — 25000125 ZZHC RX 250: Performed by: PSYCHIATRY & NEUROLOGY

## 2017-12-25 PROCEDURE — 25000132 ZZH RX MED GY IP 250 OP 250 PS 637: Performed by: PSYCHIATRY & NEUROLOGY

## 2017-12-25 PROCEDURE — 25000132 ZZH RX MED GY IP 250 OP 250 PS 637: Performed by: PHYSICIAN ASSISTANT

## 2017-12-25 PROCEDURE — 12800012 ZZH R&B CD MH INTERMEDIATE ADULT

## 2017-12-25 RX ADMIN — PHENOBARBITAL 64.8 MG: 64.8 TABLET ORAL at 08:32

## 2017-12-25 RX ADMIN — FERROUS SULFATE TAB 325 MG (65 MG ELEMENTAL FE) 325 MG: 325 (65 FE) TAB at 08:32

## 2017-12-25 RX ADMIN — ESCITALOPRAM OXALATE 10 MG: 10 TABLET ORAL at 08:32

## 2017-12-25 RX ADMIN — ACETAMINOPHEN 650 MG: 325 TABLET, FILM COATED ORAL at 11:18

## 2017-12-25 RX ADMIN — NICOTINE POLACRILEX 8 MG: 4 GUM, CHEWING ORAL at 16:26

## 2017-12-25 RX ADMIN — GABAPENTIN 100 MG: 100 CAPSULE ORAL at 14:24

## 2017-12-25 RX ADMIN — Medication 100 MG: at 08:32

## 2017-12-25 RX ADMIN — PHENOBARBITAL 64.8 MG: 64.8 TABLET ORAL at 14:24

## 2017-12-25 RX ADMIN — ALUMINUM HYDROXIDE, MAGNESIUM HYDROXIDE, AND DIMETHICONE 30 ML: 400; 400; 40 SUSPENSION ORAL at 16:25

## 2017-12-25 RX ADMIN — OMEPRAZOLE 20 MG: 20 CAPSULE, DELAYED RELEASE ORAL at 07:50

## 2017-12-25 RX ADMIN — NICOTINE POLACRILEX 8 MG: 4 GUM, CHEWING ORAL at 20:19

## 2017-12-25 RX ADMIN — DICLOFENAC 2 G: 10 GEL TOPICAL at 20:18

## 2017-12-25 RX ADMIN — NICOTINE POLACRILEX 8 MG: 4 GUM, CHEWING ORAL at 10:54

## 2017-12-25 RX ADMIN — DICLOFENAC 2 G: 10 GEL TOPICAL at 08:40

## 2017-12-25 RX ADMIN — GABAPENTIN 100 MG: 100 CAPSULE ORAL at 08:32

## 2017-12-25 RX ADMIN — NICOTINE POLACRILEX 8 MG: 4 GUM, CHEWING ORAL at 14:24

## 2017-12-25 RX ADMIN — PHENOBARBITAL 64.8 MG: 64.8 TABLET ORAL at 20:19

## 2017-12-25 RX ADMIN — MULTIPLE VITAMINS W/ MINERALS TAB 1 TABLET: TAB at 08:32

## 2017-12-25 RX ADMIN — ONDANSETRON 4 MG: 4 TABLET, ORALLY DISINTEGRATING ORAL at 04:44

## 2017-12-25 RX ADMIN — HYDROXYZINE HYDROCHLORIDE 50 MG: 25 TABLET ORAL at 16:26

## 2017-12-25 RX ADMIN — NICOTINE POLACRILEX 8 MG: 4 GUM, CHEWING ORAL at 08:32

## 2017-12-25 RX ADMIN — FOLIC ACID 1 MG: 1 TABLET ORAL at 08:32

## 2017-12-25 RX ADMIN — ACETAMINOPHEN 650 MG: 325 TABLET, FILM COATED ORAL at 20:19

## 2017-12-25 RX ADMIN — ALUMINUM HYDROXIDE, MAGNESIUM HYDROXIDE, AND DIMETHICONE 30 ML: 400; 400; 40 SUSPENSION ORAL at 08:32

## 2017-12-25 RX ADMIN — GABAPENTIN 100 MG: 100 CAPSULE ORAL at 20:19

## 2017-12-25 ASSESSMENT — ACTIVITIES OF DAILY LIVING (ADL)
GROOMING: INDEPENDENT
ORAL_HYGIENE: INDEPENDENT
GROOMING: INDEPENDENT
DRESS: INDEPENDENT
DRESS: INDEPENDENT
ORAL_HYGIENE: INDEPENDENT

## 2017-12-25 NOTE — PROGRESS NOTES
Pt has a bed at [facility] being held until 12/27. Reports they just need to confirm she went through detox. Pt has signed JAMEEL. JACK to follow-up tomorrow, 12/26 with [facility]. Pt requests discharge on 12/26.
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
## 2017-12-26 VITALS
WEIGHT: 231.38 LBS | DIASTOLIC BLOOD PRESSURE: 75 MMHG | SYSTOLIC BLOOD PRESSURE: 113 MMHG | OXYGEN SATURATION: 97 % | HEART RATE: 58 BPM | RESPIRATION RATE: 16 BRPM | TEMPERATURE: 97.9 F

## 2017-12-26 LAB — DEPRECATED CALCIDIOL+CALCIFEROL SERPL-MC: 9 UG/L (ref 20–75)

## 2017-12-26 PROCEDURE — 25000132 ZZH RX MED GY IP 250 OP 250 PS 637: Performed by: PHYSICIAN ASSISTANT

## 2017-12-26 PROCEDURE — 25000132 ZZH RX MED GY IP 250 OP 250 PS 637: Performed by: PSYCHIATRY & NEUROLOGY

## 2017-12-26 RX ORDER — FOLIC ACID 1 MG/1
1 TABLET ORAL DAILY
Qty: 90 TABLET | Refills: 3 | Status: SHIPPED | OUTPATIENT
Start: 2017-12-27 | End: 2020-06-10

## 2017-12-26 RX ORDER — BUPROPION HYDROCHLORIDE 150 MG/1
150 TABLET, EXTENDED RELEASE ORAL 2 TIMES DAILY
Qty: 180 TABLET | Refills: 3 | Status: SHIPPED | OUTPATIENT
Start: 2017-12-26 | End: 2020-06-10

## 2017-12-26 RX ORDER — HYDROXYZINE HYDROCHLORIDE 25 MG/1
25-50 TABLET, FILM COATED ORAL EVERY 4 HOURS PRN
Qty: 120 TABLET | Refills: 3 | Status: SHIPPED | OUTPATIENT
Start: 2017-12-26 | End: 2020-06-10

## 2017-12-26 RX ORDER — NALTREXONE HYDROCHLORIDE 50 MG/1
50 TABLET, FILM COATED ORAL DAILY
Qty: 90 TABLET | Refills: 3 | Status: SHIPPED | OUTPATIENT
Start: 2017-12-26 | End: 2020-06-10

## 2017-12-26 RX ORDER — ESCITALOPRAM OXALATE 10 MG/1
10 TABLET ORAL DAILY
Qty: 30 TABLET | Refills: 3 | Status: SHIPPED | OUTPATIENT
Start: 2017-12-27 | End: 2018-07-12

## 2017-12-26 RX ORDER — MULTIPLE VITAMINS W/ MINERALS TAB 9MG-400MCG
1 TAB ORAL DAILY
Qty: 90 EACH | Refills: 3 | Status: SHIPPED | OUTPATIENT
Start: 2017-12-27 | End: 2018-07-05

## 2017-12-26 RX ORDER — ACETAMINOPHEN 325 MG/1
650 TABLET ORAL EVERY 4 HOURS PRN
Qty: 100 TABLET | Refills: 3 | Status: SHIPPED | OUTPATIENT
Start: 2017-12-26 | End: 2020-06-10

## 2017-12-26 RX ORDER — GABAPENTIN 100 MG/1
100 CAPSULE ORAL 3 TIMES DAILY
Qty: 90 CAPSULE | Refills: 3 | Status: SHIPPED | OUTPATIENT
Start: 2017-12-26 | End: 2018-07-05

## 2017-12-26 RX ORDER — FERROUS SULFATE 325(65) MG
325 TABLET ORAL
Qty: 100 TABLET | Refills: 3 | Status: SHIPPED | OUTPATIENT
Start: 2017-12-27 | End: 2020-06-10

## 2017-12-26 RX ORDER — PHENOBARBITAL 32.4 MG/1
TABLET ORAL
Qty: 15 TABLET | Refills: 0 | Status: SHIPPED | OUTPATIENT
Start: 2017-12-26 | End: 2018-07-05

## 2017-12-26 RX ORDER — IBUPROFEN 600 MG/1
600 TABLET, FILM COATED ORAL EVERY 6 HOURS PRN
Qty: 120 TABLET | Refills: 3 | Status: SHIPPED | OUTPATIENT
Start: 2017-12-26 | End: 2020-06-10

## 2017-12-26 RX ADMIN — PHENOBARBITAL 64.8 MG: 64.8 TABLET ORAL at 13:00

## 2017-12-26 RX ADMIN — MULTIPLE VITAMINS W/ MINERALS TAB 1 TABLET: TAB at 07:56

## 2017-12-26 RX ADMIN — OMEPRAZOLE 20 MG: 20 CAPSULE, DELAYED RELEASE ORAL at 07:56

## 2017-12-26 RX ADMIN — Medication 100 MG: at 07:58

## 2017-12-26 RX ADMIN — NICOTINE POLACRILEX 8 MG: 4 GUM, CHEWING ORAL at 10:28

## 2017-12-26 RX ADMIN — FERROUS SULFATE TAB 325 MG (65 MG ELEMENTAL FE) 325 MG: 325 (65 FE) TAB at 07:56

## 2017-12-26 RX ADMIN — ESCITALOPRAM OXALATE 10 MG: 10 TABLET ORAL at 07:56

## 2017-12-26 RX ADMIN — FOLIC ACID 1 MG: 1 TABLET ORAL at 07:56

## 2017-12-26 RX ADMIN — NICOTINE POLACRILEX 8 MG: 4 GUM, CHEWING ORAL at 12:57

## 2017-12-26 RX ADMIN — GABAPENTIN 100 MG: 100 CAPSULE ORAL at 07:56

## 2017-12-26 RX ADMIN — PHENOBARBITAL 64.8 MG: 64.8 TABLET ORAL at 07:56

## 2017-12-26 RX ADMIN — NICOTINE POLACRILEX 8 MG: 4 GUM, CHEWING ORAL at 07:58

## 2017-12-26 RX ADMIN — GABAPENTIN 100 MG: 100 CAPSULE ORAL at 13:00

## 2017-12-26 ASSESSMENT — ACTIVITIES OF DAILY LIVING (ADL)
DRESS: INDEPENDENT
GROOMING: INDEPENDENT
ORAL_HYGIENE: INDEPENDENT

## 2017-12-26 NOTE — PROGRESS NOTES
Case Management Note  12/26/2017    Writer met with pt to confirm discharge plans. Pt confirms she is scheduled to admit to [facility]. Pt has signed a JAMEEL for [facility]. Writer faxed JAMEEL to [facility]. Writer called and left a voicemail message with [facility] to find out when pt can admit there. Pt reports she is hoping to discharge detox and admit to [facility] today, Tuesday, 12/26/17. Pt signed JAMEEL for [facility]. Writer faxed JAMEEL to [facility] requesting copy of pt's recently completed Rule 25 assessment.    Writer spoke with Suzanne at [facility] to confirm pt had a reserved bed and she could admit there today. Suzanne confirmed, pt does have a reserved bed, but would need to call writer back about pt's admit time. Suzanne called and left a voicemail message at 12:05 pm stating that the pt needs to admit at [facility] by 1:30 pm. Writer called Suzanne and informed her this would be impossible. During this conversation, pt's admit time was then extended until 2:30 pm. Writer informed pt of the admit times given to us by [facility] and the fact that we were still working on filling her discharge medications. Pt called and spoke with Suzanne. Suzanne then informed writer they were working on extending her admit time to 4:00 pm. She will call writer to notify him if this is possible.    Suzanne called writer and confirmed pt could admit at [facility] by 4:00 pm today. Pt and 3A staff notified. Case management complete.    Brennen Barfield MA, LADC

## 2017-12-26 NOTE — DISCHARGE INSTRUCTIONS
Behavioral Discharge Planning and Instructions  THANK YOU FOR CHOOSING THE [facility]  3A-W  [phone]    Summary: You were admitted to and processed through [facility] on 12/23/2017 for alcohol and sedative hypnotic dependence   You are being discharged to the [facility].  Please take care and make your recovery a priority, Miss Massey.    Recommendation: 28 days inpatient residential treatment with aftercare.    Main Diagnosis:  Alcohol and Benzodiazepinependence    Major Treatments, Procedures and Findings:  You received medical treatment for the symptoms of alcohol and benzodiazepine  withdrawal. A medical exam was performed that included lab work. You have met with a .       Symptoms to Report:  If you experience more anxiety, confusion, sleeplessness, deep sadness or thoughts of suicide, notify your treatment team or notify your primary care physician. IF ANY OF THE SYMPTOMS YOU ARE EXPERIENCING ARE A MEDICAL EMERGENCY CALL 911 IMMEDIATELY.     Lifestyle Adjustment: Adjust your lifestyle to get enough sleep, relaxation, exercise and  good nutrition. Continue to develop healthy coping skills to decrease stress and promote a sober living environment. Do not use alcohol, illegal drugs or addictive medications other than what is currently prescribed. JEROME, MEG, and  Sponsor are good resources for support.     Treatment Follow-Up: [facility], [address], [place] [address]  [phone]  Patient states that she will follow up there, as needed, for future medical concerns.    Resources:   [facility] [phone]   Support Group:  JEROME/MEG and Sponsor/support  Crisis Intervention: 241.909.7778 or 305-246-0132 (TTY: 483.947.6522).  Call anytime for help.  National [place] on Mental Illness (www.[place].liliya.org): 281.112.5764 or 362-171-8538.  Alcoholics Anonymous (www.alcoholics-anonymous.org): Check your phone book for your local chapter.  Suicide  Awareness Voices of Education (SAVE) (www.save.org): 038-425-DTTY (7283)  National Suicide Prevention Line (www.mentalhealthmn.org): 700-395-BAGZ (1653)  Mental Health Consumer/Survivor Network of [place] (www.mhcsn.net): 396.377.6902 or 657-695-2946  Mental Health Association of [place] (www.mentalhealth.org): 960.953.8772 or 631-518-1694   Substance Abuse and Mental Health Services (www.samhsa.gov)    [place] Recovery Connection ([facility])  [facility] connects people seeking recovery to resources that help foster and sustain long-term recovery.  Whether you are seeking resources for treatment, transportation, housing, job training, education, health care or other pathways to recovery, [facility] is a great place to start.  [phone].  Www.[facility].org    General Medication Instructions:   See your medication sheet(s) for instructions.   Take all medicines as directed.  Make no changes unless your doctor suggests them.     Please Note:   If you have any questions at anytime after you are discharged please call the [facility] detox unit 3A at [phone].  Please take this discharge folder with you to all your follow up appointments, it contains your lab results, diagnosis, medication list and discharge recommendations.

## 2017-12-26 NOTE — PROGRESS NOTES
Pt has not required medication for ETOH  for greater than 24 hours so is removed from detox monitoring for etoh.
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
## 2017-12-26 NOTE — PLAN OF CARE
Problem: Substance Withdrawal  Goal: Substance Withdrawal  Signs and symptoms of listed problems will be absent or manageable.   Outcome: Improving   12/25/17 3642   Substance Withdrawal   Substance Withdrawal Assessed all   48 hours assessment:  Pt denies SI/SIB/HI. Pt denies AH/VH. Pt c/o hip pain.   Pts concerns for the doctor: None  Pt attended and participated in groups. Pt is social with peers and staff.  Pt has been medication compliant.   Will continue to monitor pt and update doctor as needed.   Pt would like to attend in tx at [facility].  Pt was given Mylanta and Atarax at 1620.  Pt was given tylenol for a headache.  MSSA scores were 5 and 4.

## 2018-07-05 ENCOUNTER — OFFICE VISIT (OUTPATIENT)
Dept: FAMILY MEDICINE | Facility: CLINIC | Age: 38
End: 2018-07-05
Payer: COMMERCIAL

## 2018-07-05 VITALS
DIASTOLIC BLOOD PRESSURE: 73 MMHG | OXYGEN SATURATION: 97 % | BODY MASS INDEX: 40.04 KG/M2 | HEART RATE: 60 BPM | SYSTOLIC BLOOD PRESSURE: 106 MMHG | RESPIRATION RATE: 14 BRPM | HEIGHT: 63 IN | WEIGHT: 226 LBS | TEMPERATURE: 98.8 F

## 2018-07-05 DIAGNOSIS — Z87.898 H/O INTRAVENOUS DRUG USE IN REMISSION: ICD-10-CM

## 2018-07-05 DIAGNOSIS — Z98.84 HISTORY OF GASTRIC BYPASS: ICD-10-CM

## 2018-07-05 DIAGNOSIS — N63.10 BREAST MASS, RIGHT: ICD-10-CM

## 2018-07-05 DIAGNOSIS — M16.10 ARTHRITIS OF HIP: ICD-10-CM

## 2018-07-05 DIAGNOSIS — Z20.2 POTENTIAL EXPOSURE TO STD: ICD-10-CM

## 2018-07-05 DIAGNOSIS — Z02.89 HEALTH EXAMINATION OF DEFINED SUBPOPULATION: Primary | ICD-10-CM

## 2018-07-05 DIAGNOSIS — F10.20 UNCOMPLICATED ALCOHOL DEPENDENCE (H): ICD-10-CM

## 2018-07-05 DIAGNOSIS — K21.9 GASTROESOPHAGEAL REFLUX DISEASE WITHOUT ESOPHAGITIS: ICD-10-CM

## 2018-07-05 LAB
ALBUMIN SERPL-MCNC: 3.3 G/DL (ref 3.4–5)
ALP SERPL-CCNC: 52 U/L (ref 40–150)
ALT SERPL W P-5'-P-CCNC: 38 U/L (ref 0–50)
ANION GAP SERPL CALCULATED.3IONS-SCNC: 8 MMOL/L (ref 3–14)
AST SERPL W P-5'-P-CCNC: 62 U/L (ref 0–45)
BASOPHILS # BLD AUTO: 0 10E9/L (ref 0–0.2)
BASOPHILS NFR BLD AUTO: 0.4 %
BILIRUB SERPL-MCNC: 0.4 MG/DL (ref 0.2–1.3)
BUN SERPL-MCNC: 10 MG/DL (ref 7–30)
CALCIUM SERPL-MCNC: 8 MG/DL (ref 8.5–10.1)
CHLORIDE SERPL-SCNC: 107 MMOL/L (ref 94–109)
CHOLEST SERPL-MCNC: 134 MG/DL
CO2 SERPL-SCNC: 25 MMOL/L (ref 20–32)
CREAT SERPL-MCNC: 0.64 MG/DL (ref 0.52–1.04)
DIFFERENTIAL METHOD BLD: ABNORMAL
EOSINOPHIL # BLD AUTO: 0.1 10E9/L (ref 0–0.7)
EOSINOPHIL NFR BLD AUTO: 2.6 %
ERYTHROCYTE [DISTWIDTH] IN BLOOD BY AUTOMATED COUNT: 15.5 % (ref 10–15)
GFR SERPL CREATININE-BSD FRML MDRD: >90 ML/MIN/1.7M2
GLUCOSE SERPL-MCNC: 84 MG/DL (ref 70–99)
HCT VFR BLD AUTO: 35.2 % (ref 35–47)
HDLC SERPL-MCNC: 52 MG/DL
HGB BLD-MCNC: 10.9 G/DL (ref 11.7–15.7)
IMM GRANULOCYTES # BLD: 0 10E9/L (ref 0–0.4)
IMM GRANULOCYTES NFR BLD: 0.2 %
LDLC SERPL CALC-MCNC: 61 MG/DL
LYMPHOCYTES # BLD AUTO: 1.4 10E9/L (ref 0.8–5.3)
LYMPHOCYTES NFR BLD AUTO: 26 %
MCH RBC QN AUTO: 26.5 PG (ref 26.5–33)
MCHC RBC AUTO-ENTMCNC: 31 G/DL (ref 31.5–36.5)
MCV RBC AUTO: 85 FL (ref 78–100)
MONOCYTES # BLD AUTO: 0.4 10E9/L (ref 0–1.3)
MONOCYTES NFR BLD AUTO: 7.5 %
NEUTROPHILS # BLD AUTO: 3.5 10E9/L (ref 1.6–8.3)
NEUTROPHILS NFR BLD AUTO: 63.3 %
NONHDLC SERPL-MCNC: 82 MG/DL
NRBC # BLD AUTO: 0 10*3/UL
NRBC BLD AUTO-RTO: 0 /100
PLATELET # BLD AUTO: 370 10E9/L (ref 150–450)
POTASSIUM SERPL-SCNC: 4.2 MMOL/L (ref 3.4–5.3)
PROT SERPL-MCNC: 6.4 G/DL (ref 6.8–8.8)
RBC # BLD AUTO: 4.12 10E12/L (ref 3.8–5.2)
SODIUM SERPL-SCNC: 140 MMOL/L (ref 133–144)
TRIGL SERPL-MCNC: 104 MG/DL
VIT B12 SERPL-MCNC: 178 PG/ML (ref 193–986)
WBC # BLD AUTO: 5.5 10E9/L (ref 4–11)

## 2018-07-05 RX ORDER — MULTIPLE VITAMINS W/ MINERALS TAB 9MG-400MCG
1 TAB ORAL DAILY
Qty: 90 EACH | Refills: 3 | Status: SHIPPED | OUTPATIENT
Start: 2018-07-05 | End: 2020-06-10

## 2018-07-05 RX ORDER — GABAPENTIN 100 MG/1
400 CAPSULE ORAL 3 TIMES DAILY
Qty: 90 CAPSULE | Refills: 0 | Status: SHIPPED | OUTPATIENT
Start: 2018-07-05 | End: 2018-07-16

## 2018-07-05 RX ORDER — RAMELTEON 8 MG/1
8 TABLET ORAL AT BEDTIME
COMMUNITY
End: 2020-06-10

## 2018-07-05 ASSESSMENT — ANXIETY QUESTIONNAIRES
GAD7 TOTAL SCORE: 13
3. WORRYING TOO MUCH ABOUT DIFFERENT THINGS: MORE THAN HALF THE DAYS
IF YOU CHECKED OFF ANY PROBLEMS ON THIS QUESTIONNAIRE, HOW DIFFICULT HAVE THESE PROBLEMS MADE IT FOR YOU TO DO YOUR WORK, TAKE CARE OF THINGS AT HOME, OR GET ALONG WITH OTHER PEOPLE: VERY DIFFICULT
2. NOT BEING ABLE TO STOP OR CONTROL WORRYING: MORE THAN HALF THE DAYS
7. FEELING AFRAID AS IF SOMETHING AWFUL MIGHT HAPPEN: MORE THAN HALF THE DAYS
5. BEING SO RESTLESS THAT IT IS HARD TO SIT STILL: SEVERAL DAYS
6. BECOMING EASILY ANNOYED OR IRRITABLE: MORE THAN HALF THE DAYS
1. FEELING NERVOUS, ANXIOUS, OR ON EDGE: MORE THAN HALF THE DAYS

## 2018-07-05 ASSESSMENT — PATIENT HEALTH QUESTIONNAIRE - PHQ9: 5. POOR APPETITE OR OVEREATING: MORE THAN HALF THE DAYS

## 2018-07-05 NOTE — NURSING NOTE
"38 year old  Chief Complaint   Patient presents with     Consult     Pt. presents to the clinic today for refills on medications and fasting labs.        Blood pressure 106/73, pulse 60, temperature 98.8  F (37.1  C), temperature source Oral, resp. rate 14, height 5' 3.3\" (160.8 cm), weight 226 lb (102.5 kg), last menstrual period 06/09/2018, SpO2 97 %, not currently breastfeeding. Body mass index is 39.66 kg/(m^2).  BP completed using cuff size:    Patient Active Problem List   Diagnosis     Polysubstance abuse       Wt Readings from Last 2 Encounters:   07/05/18 226 lb (102.5 kg)     BP Readings from Last 3 Encounters:   07/05/18 106/73       Allergies   Allergen Reactions     Imitrex [Sumatriptan] Other (See Comments)     Pain in jaw and shoulder. Flushing.     Topamax [Topiramate] GI Disturbance     Trazodone Other (See Comments)     Headache, dizziness.       Current Outpatient Prescriptions   Medication     acetaminophen (TYLENOL) 325 MG tablet     ARIPIPRAZOLE PO     escitalopram (LEXAPRO) 10 MG tablet     gabapentin (NEURONTIN) 100 MG capsule     hydrOXYzine (ATARAX) 25 MG tablet     ibuprofen (ADVIL/MOTRIN) 600 MG tablet     naltrexone (DEPADE;REVIA) 50 MG tablet     omeprazole (PRILOSEC) 20 MG CR capsule     ramelteon (ROZEREM) 8 MG tablet     varenicline (CHANTIX JUAN ALBERTO) 0.5 MG X 11 & 1 MG X 42 tablet     buPROPion (WELLBUTRIN SR) 150 MG 12 hr tablet     diclofenac (VOLTAREN) 1 % GEL topical gel     ferrous sulfate (IRON) 325 (65 FE) MG tablet     folic acid (FOLVITE) 1 MG tablet     multivitamin, therapeutic with minerals (THERA-VIT-M) TABS tablet     PHENobarbital (LUMINAL) 32.4 MG TABS tablet     No current facility-administered medications for this visit.        Social History   Substance Use Topics     Smoking status: Current Every Day Smoker     Packs/day: 1.00     Smokeless tobacco: Never Used     Alcohol use Yes      Comment: Amount of alcohol varies but 750 vodka per day.         Honoring Choices - " Health Care Directive Guide offered to patient at time of visit.    Health Maintenance Due   Topic Date Due     TETANUS IMMUNIZATION (SYSTEM ASSIGNED)  04/03/1996     HIV SCREEN (SYSTEM ASSIGNED)  04/03/1996     PAP SCREENING Q3 YR (SYSTEM ASSIGNED)  04/03/1999         There is no immunization history on file for this patient.    No results found for: PAP      Recent Labs   Lab Test  12/24/17   0730   ALT  17   CR  0.58   GFRESTIMATED  >90   GFRESTBLACK  >90   ALBUMIN  2.9*   POTASSIUM  4.4   TSH  1.11       PHQ-2 ( 1999 Pfizer) 7/5/2018   Q1: Little interest or pleasure in doing things 1   Q2: Feeling down, depressed or hopeless 1   PHQ-2 Score 2       PHQ-9 SCORE 7/5/2018   Total Score 15       KELLI-7 SCORE 7/5/2018   Total Score 13       No flowsheet data found.    Chayito Romano CMA  July 5, 2018 8:11 AM

## 2018-07-05 NOTE — PATIENT INSTRUCTIONS
Return to [facility] one week  See psychiatrist for medication review as soon as possible  Mammogram (number provided)  Dental (number provided)

## 2018-07-05 NOTE — MR AVS SNAPSHOT
After Visit Summary   7/5/2018    Viviana Massey    MRN: [id number]           Patient Information     Date Of Birth          1980        Visit Information        Provider Department      7/5/2018 8:00 AM Edda Mckeon NP [facility]        Today's Diagnoses     Health examination of defined subpopulation    -  1    History of gastric bypass        Potential exposure to STD        H/O intravenous drug use in remission        Breast mass, right        Arthritis of hip        Uncomplicated alcohol dependence (H)        Gastroesophageal reflux disease without esophagitis          Care Instructions    Return to [facility] one week  See psychiatrist for medication review as soon as possible  Mammogram (number provided)  Dental (number provided)          Follow-ups after your visit        Additional Services     DENTAL REFERRAL       Your provider has referred you to: [facility]: Dental Clinic (Adult) - [place] [phone]   [email or web address]    Type: routine  Urgency: Routine     Please be aware that coverage of these services is subject to the terms and limitations of your health insurance plan.  Call member services at your health plan with any benefit or coverage questions.      Please bring the following with you to your appointment:    (1) Any X-Rays, CTs or MRIs which have been performed.  Contact the facility where they were done to arrange for  prior to your scheduled appointment.  Any new CT, MRI or other procedures ordered by your specialist must be performed at a [facility] facility or coordinated by your clinic's referral office.    (2) List of current medications   (3) This referral request   (4) Any documents/labs given to you for this referral                  Future tests that were ordered for you today     Open Future Orders        Priority Expected Expires Ordered    Screening Mammogram Digital Bilateral Routine  7/5/2019 7/5/2018  "           Who to contact     Please call your clinic at [phone] to:    Ask questions about your health    Make or cancel appointments    Discuss your medicines    Learn about your test results    Speak to your doctor            Additional Information About Your Visit        MyChart Information     [facility] is an electronic gateway that provides easy, online access to your medical records. With [facility], you can request a clinic appointment, read your test results, renew a prescription or communicate with your care team.     To sign up for [facility] visit the website at [email or web address]   You will be asked to enter the access code listed below, as well as some personal information. Please follow the directions to create your username and password.     Your access code is: [email or web address]  Expires: 10/3/2018  9:10 AM     Your access code will  in 90 days. If you need help or a new code, please contact your [facility] or call [phone] for assistance.        Care EveryWhere ID     This is your Care EveryWhere ID. This could be used by other organizations to access your [facility] medical records  [id number]        Your Vitals Were     Pulse Temperature Respirations Height Last Period Pulse Oximetry    60 98.8  F (37.1  C) (Oral) 14 5' 3.3\" (160.8 cm) 2018 (Approximate) 97%    BMI (Body Mass Index)                   39.66 kg/m2            Blood Pressure from Last 3 Encounters:   18 106/73    Weight from Last 3 Encounters:   18 226 lb (102.5 kg)              We Performed the Following     CBC with platelets differential     CHLAMYDIA TRACHOMATIS PCR     Comprehensive metabolic panel     DENTAL REFERRAL     Hepatitis B Surface Antibody     Hepatitis C antibody     HIV Antigen Antibody Combo     Lipid panel reflex to direct LDL Fasting     NEISSERIA GONORRHOEA PCR     Treponema Abs w Reflex to RPR and Titer     Vitamin B12     Vitamin D Level        "   Today's Medication Changes          These changes are accurate as of 7/5/18  9:26 AM.  If you have any questions, ask your nurse or doctor.               These medicines have changed or have updated prescriptions.        Dose/Directions    gabapentin 100 MG capsule   Commonly known as:  NEURONTIN   This may have changed:  how much to take   Used for:  Arthritis of hip   Changed by:  Edda Mckeon NP        Dose:  400 mg   Take 4 capsules (400 mg) by mouth 3 times daily   Quantity:  90 capsule   Refills:  0       hydrOXYzine 25 MG tablet   Commonly known as:  ATARAX   This may have changed:  how much to take   Used for:  KELLI (generalized anxiety disorder)        Dose:  25-50 mg   Take 1-2 tablets (25-50 mg) by mouth every 4 hours as needed for anxiety   Quantity:  120 tablet   Refills:  3         Stop taking these medicines if you haven't already. Please contact your care team if you have questions.     varenicline 0.5 MG X 11 & 1 MG X 42 tablet   Commonly known as:  CHANTIX JUAN ALBERTO   Stopped by:  Edda Mckeon NP                Where to get your medicines      These medications were sent to [id number] - [place], [place] - [address]  [address], [place] [address]     Phone:  [phone]     gabapentin 100 MG capsule    multivitamin, therapeutic with minerals Tabs tablet    omeprazole 20 MG CR capsule                Primary Care Provider Office Phone # Fax #    Tracey Lizama [phone]       [address] [address]        Equal Access to Services     ALECIA TESFAYE AH: Hadii keshav woo hadasho Soselvinali, waaxda luqadaha, qaybta kaalmada adeegyada, waxay lan quiroga. So [facility] [phone].    ATENCIÓN: Si habla español, tiene a aviles disposición servicios gratuitos de asistencia lingüística. Llame al [phone].    We comply with applicable federal civil rights laws and [place] laws. We do  not discriminate on the basis of race, color, national origin, age, disability, sex, sexual orientation, or gender identity.            Thank you!     Thank you for choosing [facility]  for your care. Our goal is always to provide you with excellent care. Hearing back from our patients is one way we can continue to improve our services. Please take a few minutes to complete the written survey that you may receive in the mail after your visit with us. Thank you!             Your Updated Medication List - Protect others around you: Learn how to safely use, store and throw away your medicines at www.disposemymeds.org.          This list is accurate as of 7/5/18  9:26 AM.  Always use your most recent med list.                   Brand Name Dispense Instructions for use Diagnosis    acetaminophen 325 MG tablet    TYLENOL    100 tablet    Take 2 tablets (650 mg) by mouth every 4 hours as needed for mild pain    Tension headache       ARIPIPRAZOLE PO      Take 5 mg by mouth daily        buPROPion 150 MG 12 hr tablet    WELLBUTRIN SR    180 tablet    Take 1 tablet (150 mg) by mouth 2 times daily    Uncomplicated alcohol dependence (H)       diclofenac 1 % Gel topical gel    VOLTAREN    100 g    Apply 2 g topically 2 times daily    Arthritis of hip       escitalopram 10 MG tablet    LEXAPRO    30 tablet    Take 1 tablet (10 mg) by mouth daily    KELLI (generalized anxiety disorder)       ferrous sulfate 325 (65 Fe) MG tablet    IRON    100 tablet    Take 1 tablet (325 mg) by mouth daily (with breakfast)    Iron deficiency anemia secondary to inadequate dietary iron intake       folic acid 1 MG tablet    FOLVITE    90 tablet    Take 1 tablet (1 mg) by mouth daily    Uncomplicated alcohol dependence (H)       gabapentin 100 MG capsule    NEURONTIN    90 capsule    Take 4 capsules (400 mg) by mouth 3 times daily    Arthritis of hip       hydrOXYzine 25 MG tablet    ATARAX    120 tablet    Take 1-2 tablets (25-50 mg) by  mouth every 4 hours as needed for anxiety    KELLI (generalized anxiety disorder)       ibuprofen 600 MG tablet    ADVIL/MOTRIN    120 tablet    Take 1 tablet (600 mg) by mouth every 6 hours as needed for moderate pain    Tension headache       multivitamin, therapeutic with minerals Tabs tablet     90 each    Take 1 tablet by mouth daily    Uncomplicated alcohol dependence (H)       naltrexone 50 MG tablet    DEPADE;REVIA    90 tablet    Take 1 tablet (50 mg) by mouth daily    Uncomplicated alcohol dependence (H)       omeprazole 20 MG CR capsule    priLOSEC    30 capsule    Take 1 capsule (20 mg) by mouth every morning (before breakfast)    Gastroesophageal reflux disease without esophagitis       ramelteon 8 MG tablet    ROZEREM     Take 8 mg by mouth At Bedtime

## 2018-07-06 LAB
DEPRECATED CALCIDIOL+CALCIFEROL SERPL-MC: 29 UG/L (ref 20–75)
HBV SURFACE AB SERPL IA-ACNC: 4.94 M[IU]/ML
HCV AB SERPL QL IA: NONREACTIVE
HIV 1+2 AB+HIV1 P24 AG SERPL QL IA: NONREACTIVE
T PALLIDUM AB SER QL: NONREACTIVE

## 2018-07-06 ASSESSMENT — PATIENT HEALTH QUESTIONNAIRE - PHQ9: SUM OF ALL RESPONSES TO PHQ QUESTIONS 1-9: 15

## 2018-07-06 ASSESSMENT — ANXIETY QUESTIONNAIRES: GAD7 TOTAL SCORE: 13

## 2018-07-06 NOTE — PROGRESS NOTES
"Viviana Massey is a 38 year old female pt here for an annual physical. Viviana  needs an admission physical to [facility] chemical dependency treatment facility and was admitted one week ago.  Patient has a history of chemical dependency to meth and alcohol.  Discontinuation date for meth, 6/2, discontinuation for alcohol, one week.  She was in treatment and sober for 5 months before the most recent relapse.  Last visit to the dentist was unknown.  Last visit to an eye provider was \"awhile, she has lost her best glasses but has some for now.\"      Current concerns:  Needs medications refilled, has some diarrhea and gas pains and relates this to eating now when she wasn't eating when she was using meth.    Viviana has chronic arthritis pain in her back and hips, greater on the left.  She is using gabepentin for this pain, 900 mg TID.    Past Medical History:   Diagnosis Date     Anxiety      Arthritis      Depressive disorder      Insomnia      Substance abuse                Patient's last menstrual period was 06/09/2018 (approximate).  Menses - heavy  History of abnormal paps - no  History of STI's -  no    Past Surgical History:   Procedure Laterality Date     GASTRIC BYPASS  2012     GI SURGERY  2013    gastric bipass     GYN SURGERY      D and C.       Social History     Social History     Marital status: Single     Spouse name: N/A     Number of children: N/A     Years of education: N/A     Occupational History     Not on file.     Social History Main Topics     Smoking status: Current Every Day Smoker     Packs/day: 1.00     Smokeless tobacco: Never Used     Alcohol use Yes      Comment: Amount of alcohol varies but 750 vodka per day.     Drug use: Yes     Special: Methamphetamines      Comment: Benzo from off the street.  Takes as pill.  Last used meth on Tuesday- smokes.     Sexual activity: Yes     Partners: Male     Birth control/ protection: None     Other Topics Concern     Not on file     Social History " Narrative       Family History   Problem Relation Age of Onset     Esophageal Cancer Father      Cancer Maternal Grandmother      Lymphoma Maternal Uncle      Cancer Paternal Grandfather      Arthritis Mother        Current Outpatient Prescriptions   Medication Sig Dispense Refill     acetaminophen (TYLENOL) 325 MG tablet Take 2 tablets (650 mg) by mouth every 4 hours as needed for mild pain 100 tablet 3     ARIPIPRAZOLE PO Take 5 mg by mouth daily       escitalopram (LEXAPRO) 10 MG tablet Take 1 tablet (10 mg) by mouth daily 30 tablet 3     gabapentin (NEURONTIN) 100 MG capsule Take 4 capsules (400 mg) by mouth 3 times daily 90 capsule 0     hydrOXYzine (ATARAX) 25 MG tablet Take 1-2 tablets (25-50 mg) by mouth every 4 hours as needed for anxiety (Patient taking differently: Take 50 mg by mouth every 4 hours as needed for anxiety ) 120 tablet 3     ibuprofen (ADVIL/MOTRIN) 600 MG tablet Take 1 tablet (600 mg) by mouth every 6 hours as needed for moderate pain 120 tablet 3     multivitamin, therapeutic with minerals (THERA-VIT-M) TABS tablet Take 1 tablet by mouth daily 90 each 3     naltrexone (DEPADE;REVIA) 50 MG tablet Take 1 tablet (50 mg) by mouth daily 90 tablet 3     omeprazole (PRILOSEC) 20 MG CR capsule Take 1 capsule (20 mg) by mouth every morning (before breakfast) 30 capsule 3     ramelteon (ROZEREM) 8 MG tablet Take 8 mg by mouth At Bedtime       buPROPion (WELLBUTRIN SR) 150 MG 12 hr tablet Take 1 tablet (150 mg) by mouth 2 times daily (Patient not taking: Reported on 7/5/2018) 180 tablet 3     diclofenac (VOLTAREN) 1 % GEL topical gel Apply 2 g topically 2 times daily (Patient not taking: Reported on 7/5/2018) 100 g 3     ferrous sulfate (IRON) 325 (65 FE) MG tablet Take 1 tablet (325 mg) by mouth daily (with breakfast) (Patient not taking: Reported on 7/5/2018) 100 tablet 3     folic acid (FOLVITE) 1 MG tablet Take 1 tablet (1 mg) by mouth daily (Patient not taking: Reported on 7/5/2018) 90 tablet 3  "      Allergies   Allergen Reactions     Imitrex [Sumatriptan] Other (See Comments)     Pain in jaw and shoulder. Flushing.     Topamax [Topiramate] GI Disturbance     Trazodone Other (See Comments)     Headache, dizziness.       HEALTH CARE MAINTENANCE:  Last Pap:  December 2017.  Last Mammo:  never  Colonoscopy/Flex sig:  N/A      There is no immunization history on file for this patient.    Review Of Systems   ROS: 10 point ROS neg other than the symptoms noted above in the HPI.      OBJECTIVE:     /73 (BP Location: Left arm, Patient Position: Chair, Cuff Size: Adult Large)  Pulse 60  Temp 98.8  F (37.1  C) (Oral)  Resp 14  Ht 5' 3.3\" (160.8 cm)  Wt 226 lb (102.5 kg)  LMP 06/09/2018 (Approximate)  SpO2 97%  BMI 39.66 kg/m2  General appearance: Healthy, no acute distress.  Skin: No rashes or suspicious skin lesions noted.  Mental Status: Cooperative, normal affect.  HEENT:   Ears- Normal external canals and TMs.  Eyes- PERRL, EOM's intact.  Oropharynx - Normal.  Nodes- Negative.  Thyroid: Normal to palpation, no enlargement or nodules noted.  Breasts: Symmetric without mass, tenderness or discharge.  Axillary nodes negative.  Lungs: Clear to auscultation.   Heart: Normal rate and rhythm.  No murmurs noted.    Abdomen: BS active. Soft, non-tender, no masses or organomegaly.   Genitalia:Deferred, last pap/pelvic 12/17, normal  Extremities: Without edema.    ASSESSMENT:   Physical exam completed.  Patient Active Problem List   Diagnosis     Polysubstance abuse       PLAN:    Labs:  Cbc, lipids, HIV, GC/chlamydia, cmp  Refill:  Gabapentin, MVI, omeprazole  RTC one week      "

## 2018-07-07 LAB
C TRACH DNA SPEC QL NAA+PROBE: NEGATIVE
N GONORRHOEA DNA SPEC QL NAA+PROBE: NEGATIVE
SPECIMEN SOURCE: NORMAL
SPECIMEN SOURCE: NORMAL

## 2018-07-12 ENCOUNTER — OFFICE VISIT (OUTPATIENT)
Dept: FAMILY MEDICINE | Facility: CLINIC | Age: 38
End: 2018-07-12
Payer: COMMERCIAL

## 2018-07-12 VITALS
TEMPERATURE: 99 F | RESPIRATION RATE: 16 BRPM | HEART RATE: 68 BPM | OXYGEN SATURATION: 98 % | BODY MASS INDEX: 38.8 KG/M2 | HEIGHT: 63 IN | WEIGHT: 219 LBS | DIASTOLIC BLOOD PRESSURE: 69 MMHG | SYSTOLIC BLOOD PRESSURE: 107 MMHG

## 2018-07-12 DIAGNOSIS — G89.29 OTHER CHRONIC PAIN: ICD-10-CM

## 2018-07-12 DIAGNOSIS — N92.6 LATE PERIOD: Primary | ICD-10-CM

## 2018-07-12 DIAGNOSIS — M16.10 ARTHRITIS OF HIP: ICD-10-CM

## 2018-07-12 DIAGNOSIS — F41.1 GAD (GENERALIZED ANXIETY DISORDER): ICD-10-CM

## 2018-07-12 DIAGNOSIS — Z78.9 NOT IMMUNE TO HEPATITIS B VIRUS: ICD-10-CM

## 2018-07-12 LAB — HCG UR QL: NEGATIVE

## 2018-07-12 RX ORDER — MELOXICAM 7.5 MG/1
7.5 TABLET ORAL DAILY
Qty: 30 TABLET | Refills: 1 | Status: SHIPPED | OUTPATIENT
Start: 2018-07-12 | End: 2020-06-10

## 2018-07-12 RX ORDER — ESCITALOPRAM OXALATE 20 MG/1
20 TABLET ORAL DAILY
Qty: 30 TABLET | Refills: 1 | Status: SHIPPED | OUTPATIENT
Start: 2018-07-12 | End: 2020-06-10

## 2018-07-12 ASSESSMENT — ANXIETY QUESTIONNAIRES
7. FEELING AFRAID AS IF SOMETHING AWFUL MIGHT HAPPEN: NOT AT ALL
IF YOU CHECKED OFF ANY PROBLEMS ON THIS QUESTIONNAIRE, HOW DIFFICULT HAVE THESE PROBLEMS MADE IT FOR YOU TO DO YOUR WORK, TAKE CARE OF THINGS AT HOME, OR GET ALONG WITH OTHER PEOPLE: SOMEWHAT DIFFICULT
GAD7 TOTAL SCORE: 17
2. NOT BEING ABLE TO STOP OR CONTROL WORRYING: NEARLY EVERY DAY
5. BEING SO RESTLESS THAT IT IS HARD TO SIT STILL: NEARLY EVERY DAY
1. FEELING NERVOUS, ANXIOUS, OR ON EDGE: NEARLY EVERY DAY
3. WORRYING TOO MUCH ABOUT DIFFERENT THINGS: NEARLY EVERY DAY
6. BECOMING EASILY ANNOYED OR IRRITABLE: MORE THAN HALF THE DAYS

## 2018-07-12 ASSESSMENT — PATIENT HEALTH QUESTIONNAIRE - PHQ9: 5. POOR APPETITE OR OVEREATING: NEARLY EVERY DAY

## 2018-07-12 NOTE — NURSING NOTE
"38 year old  Chief Complaint   Patient presents with     RECHECK     Pt. presents to the clinic today for a medication follow up and lab results.        Blood pressure 107/69, pulse 68, temperature 99  F (37.2  C), temperature source Oral, resp. rate 16, height 5' 2.6\" (159 cm), weight 219 lb (99.3 kg), last menstrual period 06/06/2018, SpO2 98 %, not currently breastfeeding. Body mass index is 39.29 kg/(m^2).  BP completed using cuff size:    Patient Active Problem List   Diagnosis     Polysubstance abuse       Wt Readings from Last 2 Encounters:   07/12/18 219 lb (99.3 kg)   07/05/18 226 lb (102.5 kg)     BP Readings from Last 3 Encounters:   07/12/18 107/69   07/05/18 106/73       Allergies   Allergen Reactions     Imitrex [Sumatriptan] Other (See Comments)     Pain in jaw and shoulder. Flushing.     Topamax [Topiramate] GI Disturbance     Trazodone Other (See Comments)     Headache, dizziness.       Current Outpatient Prescriptions   Medication     acetaminophen (TYLENOL) 325 MG tablet     ARIPIPRAZOLE PO     escitalopram (LEXAPRO) 10 MG tablet     ferrous sulfate (IRON) 325 (65 FE) MG tablet     gabapentin (NEURONTIN) 100 MG capsule     hydrOXYzine (ATARAX) 25 MG tablet     ibuprofen (ADVIL/MOTRIN) 600 MG tablet     multivitamin, therapeutic with minerals (THERA-VIT-M) TABS tablet     naltrexone (DEPADE;REVIA) 50 MG tablet     omeprazole (PRILOSEC) 20 MG CR capsule     ramelteon (ROZEREM) 8 MG tablet     buPROPion (WELLBUTRIN SR) 150 MG 12 hr tablet     diclofenac (VOLTAREN) 1 % GEL topical gel     folic acid (FOLVITE) 1 MG tablet     No current facility-administered medications for this visit.        Social History   Substance Use Topics     Smoking status: Current Every Day Smoker     Packs/day: 1.00     Smokeless tobacco: Never Used     Alcohol use Yes      Comment: Amount of alcohol varies but 750 vodka per day.         Honoring Choices - Health Care Directive Guide offered to patient at time of " visit.    Health Maintenance Due   Topic Date Due     TETANUS IMMUNIZATION (SYSTEM ASSIGNED)  04/03/1996     PAP SCREENING Q3 YR (SYSTEM ASSIGNED)  04/03/1999         There is no immunization history on file for this patient.    No results found for: PAP      Recent Labs   Lab Test  07/05/18   0915  12/24/17   0730   LDL  61   --    HDL  52   --    TRIG  104   --    ALT  38  17   CR  0.64  0.58   GFRESTIMATED  >90  >90   GFRESTBLACK  >90  >90   ALBUMIN  3.3*  2.9*   POTASSIUM  4.2  4.4   TSH   --   1.11       PHQ-2 ( 1999 Pfizer) 7/12/2018 7/5/2018   Q1: Little interest or pleasure in doing things 0 1   Q2: Feeling down, depressed or hopeless 0 1   PHQ-2 Score 0 2       PHQ-9 SCORE 7/5/2018 7/12/2018   Total Score 15 13       KELLI-7 SCORE 7/5/2018 7/12/2018   Total Score 13 17     Screening Questionnaire for Adult Immunization    Are you sick today?   No   Do you have allergies to medications, food, a vaccine component or latex?   No   Have you ever had a serious reaction after receiving a vaccination?   No   Do you have a long-term health problem with heart disease, lung disease, asthma, kidney disease, metabolic disease (e.g. diabetes), anemia, or other blood disorder?   No   Do you have cancer, leukemia, HIV/AIDS, or any other immune system problem?   No   In the past 3 months, have you taken medications that affect  your immune system, such as prednisone, other steroids, or anticancer drugs; drugs for the treatment of rheumatoid arthritis, Crohn s disease, or psoriasis; or have you had radiation treatments?   No   Have you had a seizure, or a brain or other nervous system problem?   No   During the past year, have you received a transfusion of blood or blood     products, or been given immune (gamma) globulin or antiviral drug?   No   For women: Are you pregnant or is there a chance you could become        pregnant during the next month?   No   Have you received any vaccinations in the past 4 weeks?   No      Immunization questionnaire answers were all negative.        Per orders of Edda Mckeon, injection of HEP B given by Chayito Romano. Patient instructed to remain in clinic for 15 minutes afterwards, and to report any adverse reaction to me immediately.       Screening performed by Chayito Romano on 7/12/2018 at 4:17 PM.      No flowsheet data found.    Chayito Romano CMA  July 12, 2018 3:39 PM

## 2018-07-12 NOTE — MR AVS SNAPSHOT
"              After Visit Summary   7/12/2018    Viviana Massey    MRN: [id number]           Patient Information     Date Of Birth          1980        Visit Information        Provider Department      7/12/2018 4:00 PM Edda Mckeon NP [facility]        Today's Diagnoses     Late period    -  1    Not immune to hepatitis B virus        KELLI (generalized anxiety disorder)        Other chronic pain           Follow-ups after your visit        Your next 10 appointments already scheduled     Jul 27, 2018  3:00 PM CDT   MA DIAGNOSTIC DIGITAL BILATERAL with [id number]   [facility] ([facility])    [address] [place] [address], 2nd Floor  [facility] [address] [phone]           Do not use any powder, lotion or deodorant under your arms or on your breast. If you do, we will ask you to remove it before your exam.  Wear comfortable, two-piece clothing.  If you have any allergies, tell your care team.  Bring any previous mammograms from other facilities or have them mailed to the breast center.  Three-dimensional (3D) mammograms are available at [facility] locations in [facility], [facility], [facility], and [place]. [facility] locations include [place] [facility] in [place]. Benefits of 3D mammograms include: - Improved rate of cancer detection - Decreases your chance of having to go back for more tests, which means fewer: - \"False-positive\" results (This means that there is an abnormal area but it isn't cancer.) - Invasive testing procedures, such as a biopsy or surgery - Can provide clearer images of the breast if you have dense breast tissue. 3D mammography is an optional exam that anyone can have with a 2D mammogram. It doesn't replace or take the place of a 2D mammogram. 2D mammograms remain an effective screening test for all women.  Not all insurance companies cover the cost of a 3D mammogram. " Check with your insurance.            2018  3:30 PM CDT   US BREAST RIGHT LIMITED 1-3 QUAD with [email or web address]   [facility] ([facility])    [address], 2nd Floor  [facility] [address] [phone]           Please bring a list of your medicines (including vitamins, minerals and over-the-counter drugs). Also, tell your doctor about any allergies you may have. Wear comfortable clothes and leave your valuables at home.  You do not need to do anything special to prepare for your exam.  Please call the Imaging Department at your exam site with any questions.              Future tests that were ordered for you today     Open Future Orders        Priority Expected Expires Ordered    US Breast Right Routine  2019    MA Diagnostic Digital Bilateral Routine  2019            Who to contact     Please call your clinic at [phone] to:    Ask questions about your health    Make or cancel appointments    Discuss your medicines    Learn about your test results    Speak to your doctor            Additional Information About Your Visit        LibriLoop Information     LibriLoop is an electronic gateway that provides easy, online access to your medical records. With LibriLoop, you can request a clinic appointment, read your test results, renew a prescription or communicate with your care team.     To sign up for LibriLoop visit the website at [email or web address]   You will be asked to enter the access code listed below, as well as some personal information. Please follow the directions to create your username and password.     Your access code is: [email or web address]  Expires: 10/3/2018  9:10 AM     Your access code will  in 90 days. If you need help or a new code, please contact your [facility] or call [phone] for assistance.        Care EveryWhere ID     This is your Care EveryWhere ID. This could be  "used by other organizations to access your [facility] medical records  [id number]        Your Vitals Were     Pulse Temperature Respirations Height Last Period Pulse Oximetry    68 99  F (37.2  C) (Oral) 16 5' 2.6\" (159 cm) 06/06/2018 (Exact Date) 98%    BMI (Body Mass Index)                   39.29 kg/m2            Blood Pressure from Last 3 Encounters:   07/12/18 107/69   07/05/18 106/73    Weight from Last 3 Encounters:   07/12/18 219 lb (99.3 kg)   07/05/18 226 lb (102.5 kg)              We Performed the Following     HCG Qualitative Urine (UPT) ([facility])     HEPATITIS B VACCINE,ADULT,IM          Today's Medication Changes          These changes are accurate as of 7/12/18  4:11 PM.  If you have any questions, ask your nurse or doctor.               Start taking these medicines.        Dose/Directions    meloxicam 7.5 MG tablet   Commonly known as:  MOBIC   Used for:  Other chronic pain   Started by:  Edda Mckeon NP        Dose:  7.5 mg   Take 1 tablet (7.5 mg) by mouth daily   Quantity:  30 tablet   Refills:  1         These medicines have changed or have updated prescriptions.        Dose/Directions    escitalopram 20 MG tablet   Commonly known as:  LEXAPRO   This may have changed:    - medication strength  - how much to take   Used for:  KELLI (generalized anxiety disorder)   Changed by:  Edda Mckeon NP        Dose:  20 mg   Take 1 tablet (20 mg) by mouth daily   Quantity:  30 tablet   Refills:  1       hydrOXYzine 25 MG tablet   Commonly known as:  ATARAX   This may have changed:  how much to take   Used for:  KELLI (generalized anxiety disorder)        Dose:  25-50 mg   Take 1-2 tablets (25-50 mg) by mouth every 4 hours as needed for anxiety   Quantity:  120 tablet   Refills:  3            Where to get your medicines      These medications were sent to [facility] [place] [facility] - [place], [place] - [address]  [address], [place] [address]     Phone:  " [phone]     escitalopram 20 MG tablet    meloxicam 7.5 MG tablet                Primary Care Provider Office Phone # Fax #    Tracey Lizama [phone]       [facility] [address] [facility] [address]        Equal Access to Services     JOSEFA TESFAYE : Hadii aad ku hadasho Soomaali, waaxda luqadaha, qaybta kaalmada adeegyada, waxprisca goddardin haytinyn adeernestine cecilypola rosa . So [facility] [phone].    ATENCIÓN: Si habla español, tiene a aviles disposición servicios gratuitos de asistencia lingüística. Ramón al [phone].    We comply with applicable federal civil rights laws and [place] laws. We do not discriminate on the basis of race, color, national origin, age, disability, sex, sexual orientation, or gender identity.            Thank you!     Thank you for choosing [facility]  for your care. Our goal is always to provide you with excellent care. Hearing back from our patients is one way we can continue to improve our services. Please take a few minutes to complete the written survey that you may receive in the mail after your visit with us. Thank you!             Your Updated Medication List - Protect others around you: Learn how to safely use, store and throw away your medicines at www.disposemymeds.org.          This list is accurate as of 7/12/18  4:11 PM.  Always use your most recent med list.                   Brand Name Dispense Instructions for use Diagnosis    acetaminophen 325 MG tablet    TYLENOL    100 tablet    Take 2 tablets (650 mg) by mouth every 4 hours as needed for mild pain    Tension headache       ARIPIPRAZOLE PO      Take 5 mg by mouth daily        buPROPion 150 MG 12 hr tablet    WELLBUTRIN SR    180 tablet    Take 1 tablet (150 mg) by mouth 2 times daily    Uncomplicated alcohol dependence (H)       diclofenac 1 % Gel topical gel    VOLTAREN    100 g    Apply 2 g topically 2 times daily    Arthritis of hip       escitalopram 20 MG tablet     LEXAPRO    30 tablet    Take 1 tablet (20 mg) by mouth daily    KELLI (generalized anxiety disorder)       ferrous sulfate 325 (65 Fe) MG tablet    IRON    100 tablet    Take 1 tablet (325 mg) by mouth daily (with breakfast)    Iron deficiency anemia secondary to inadequate dietary iron intake       folic acid 1 MG tablet    FOLVITE    90 tablet    Take 1 tablet (1 mg) by mouth daily    Uncomplicated alcohol dependence (H)       gabapentin 100 MG capsule    NEURONTIN    90 capsule    Take 4 capsules (400 mg) by mouth 3 times daily    Arthritis of hip       hydrOXYzine 25 MG tablet    ATARAX    120 tablet    Take 1-2 tablets (25-50 mg) by mouth every 4 hours as needed for anxiety    KELLI (generalized anxiety disorder)       ibuprofen 600 MG tablet    ADVIL/MOTRIN    120 tablet    Take 1 tablet (600 mg) by mouth every 6 hours as needed for moderate pain    Tension headache       meloxicam 7.5 MG tablet    MOBIC    30 tablet    Take 1 tablet (7.5 mg) by mouth daily    Other chronic pain       multivitamin, therapeutic with minerals Tabs tablet     90 each    Take 1 tablet by mouth daily    Uncomplicated alcohol dependence (H)       naltrexone 50 MG tablet    DEPADE;REVIA    90 tablet    Take 1 tablet (50 mg) by mouth daily    Uncomplicated alcohol dependence (H)       omeprazole 20 MG CR capsule    priLOSEC    30 capsule    Take 1 capsule (20 mg) by mouth every morning (before breakfast)    Gastroesophageal reflux disease without esophagitis       ramelteon 8 MG tablet    ROZEREM     Take 8 mg by mouth At Bedtime

## 2018-07-13 ASSESSMENT — PATIENT HEALTH QUESTIONNAIRE - PHQ9: SUM OF ALL RESPONSES TO PHQ QUESTIONS 1-9: 13

## 2018-07-13 ASSESSMENT — ANXIETY QUESTIONNAIRES: GAD7 TOTAL SCORE: 17

## 2018-07-16 RX ORDER — GABAPENTIN 400 MG/1
400 CAPSULE ORAL 3 TIMES DAILY
Qty: 90 CAPSULE | Refills: 1 | Status: SHIPPED | OUTPATIENT
Start: 2018-07-16 | End: 2020-06-10

## 2018-07-16 NOTE — PROGRESS NOTES
Viviana Massey is a 38 year old female who presents today for follow up from E visit.  Viviana had agreed to reducing her gabepentin to 400 mg three times daily from 600 mg 3 times daily.  She states she is having more pain but is willing to continue at this dose.  She is wondering if she could get an egg shell mattress, she had received one at a previous rehab facility and it was quite helpful.  The pain primarily seems to be in her hips.    Viviana is also here to review her labs.      Viviana states that her menses is 9 days late.  She had unprotected intercourse on 7/23/18, she is a bit concerned about a pregnancy.  No nausea, breast tenderness, fatigue.  She has been pregnant before and has no similar symptoms other than amenorrhea.        Review Of Systems   ROS: 10 point ROS neg other than the symptoms noted above in the HPI.      Past Medical History:   Diagnosis Date     Anxiety      Arthritis      Depressive disorder      Insomnia      Substance abuse      Past Surgical History:   Procedure Laterality Date     GASTRIC BYPASS  2012     GI SURGERY  2013    gastric bipass     GYN SURGERY      D and C.     Social History     Social History     Marital status: Single     Spouse name: N/A     Number of children: N/A     Years of education: N/A     Occupational History     Not on file.     Social History Main Topics     Smoking status: Current Every Day Smoker     Packs/day: 1.00     Smokeless tobacco: Never Used     Alcohol use Yes      Comment: Amount of alcohol varies but 750 vodka per day.     Drug use: Yes     Special: Methamphetamines      Comment: Benzo from off the street.  Takes as pill.  Last used meth on Tuesday- smokes.     Sexual activity: Yes     Partners: Male     Birth control/ protection: None     Other Topics Concern     Not on file     Social History Narrative     Family History   Problem Relation Age of Onset     Esophageal Cancer Father      Cancer Maternal Grandmother      Lymphoma Maternal  "Uncle      Cancer Paternal Grandfather      Arthritis Mother        /69 (BP Location: Left arm, Patient Position: Chair, Cuff Size: Adult Large)  Pulse 68  Temp 99  F (37.2  C) (Oral)  Resp 16  Ht 5' 2.6\" (159 cm)  Wt 219 lb (99.3 kg)  LMP 06/06/2018 (Exact Date)  SpO2 98%  BMI 39.29 kg/m2    Exam:  Constitutional: healthy, alert and no distress  Cardiovascular: negative, PMI normal. No lifts, heaves, or thrills. RRR. No murmurs, clicks gallops or rub  Respiratory: negative, Percussion normal. Good diaphragmatic excursion. Lungs clear  : urine HCG negative  Musculoskeletal: extremities normal- no gross deformities noted, gait normal and normal muscle tone  Psychiatric: mentation appears normal and affect normal/bright    Assessment/Plan:  1. Late period    - HCG Qualitative Urine (UPT) ([facility])  Reassured patient that she was not pregnant    2. Not immune to hepatitis B virus    - HEPATITIS B VACCINE,ADULT,IM    3. KELLI (generalized anxiety disorder)    - escitalopram (LEXAPRO) 20 MG tablet; Take 1 tablet (20 mg) by mouth daily  Dispense: 30 tablet; Refill: 1    4. Other chronic pain  Continue same dose of gabepentin.  Order egg shell mattress    All labs reviewed, she will take her B12 supplement.  We will recheck labs in 3 months.    - meloxicam (MOBIC) 7.5 MG tablet; Take 1 tablet (7.5 mg) by mouth daily  Dispense: 30 tablet; Refill: 1    5. Arthritis of hip    - gabapentin (NEURONTIN) 400 MG capsule; Take 1 capsule (400 mg) by mouth 3 times daily  Dispense: 90 capsule; Refill: 1    "

## 2018-08-21 PROBLEM — F41.9 ANXIETY: Status: ACTIVE | Noted: 2018-06-18

## 2018-08-21 PROBLEM — K29.70 GASTRITIS: Status: ACTIVE | Noted: 2018-06-20

## 2018-08-21 PROBLEM — F43.10 PTSD (POST-TRAUMATIC STRESS DISORDER): Status: ACTIVE | Noted: 2018-06-17

## 2018-08-21 PROBLEM — F50.819 BINGE EATING DISORDER: Status: ACTIVE | Noted: 2018-01-31

## 2018-08-21 PROBLEM — R45.851 SUICIDAL IDEATION: Status: ACTIVE | Noted: 2018-06-17

## 2020-01-23 ENCOUNTER — TRANSFERRED RECORDS (OUTPATIENT)
Dept: HEALTH INFORMATION MANAGEMENT | Facility: OTHER | Age: 40
End: 2020-01-23

## 2020-06-10 ENCOUNTER — HOSPITAL ENCOUNTER (INPATIENT)
Facility: CLINIC | Age: 40
LOS: 2 days | Discharge: PSYCHIATRIC OR SUBSTANCE USE FACILITY | End: 2020-06-12
Attending: EMERGENCY MEDICINE | Admitting: PSYCHIATRY & NEUROLOGY
Payer: COMMERCIAL

## 2020-06-10 ENCOUNTER — TELEPHONE (OUTPATIENT)
Dept: BEHAVIORAL HEALTH | Facility: CLINIC | Age: 40
End: 2020-06-10

## 2020-06-10 DIAGNOSIS — F10.939 ALCOHOL WITHDRAWAL SYNDROME, WITH UNSPECIFIED COMPLICATION (H): Primary | ICD-10-CM

## 2020-06-10 DIAGNOSIS — F10.220 ALCOHOL DEPENDENCE WITH UNCOMPLICATED INTOXICATION (H): ICD-10-CM

## 2020-06-10 DIAGNOSIS — Z03.818 ENCNTR FOR OBS FOR SUSP EXPSR TO OTH BIOLG AGENTS RULED OUT: ICD-10-CM

## 2020-06-10 LAB
ALBUMIN SERPL-MCNC: 3.4 G/DL (ref 3.4–5)
ALCOHOL BREATH TEST: ABNORMAL (ref 0–0.01)
ALP SERPL-CCNC: 68 U/L (ref 40–150)
ALT SERPL W P-5'-P-CCNC: 20 U/L (ref 0–50)
AMPHETAMINES UR QL SCN: NEGATIVE
ANION GAP SERPL CALCULATED.3IONS-SCNC: 8 MMOL/L (ref 3–14)
AST SERPL W P-5'-P-CCNC: 30 U/L (ref 0–45)
BARBITURATES UR QL: NEGATIVE
BASOPHILS # BLD AUTO: 0 10E9/L (ref 0–0.2)
BASOPHILS NFR BLD AUTO: 0.6 %
BENZODIAZ UR QL: NEGATIVE
BILIRUB SERPL-MCNC: 0.3 MG/DL (ref 0.2–1.3)
BUN SERPL-MCNC: 8 MG/DL (ref 7–30)
CALCIUM SERPL-MCNC: 8 MG/DL (ref 8.5–10.1)
CANNABINOIDS UR QL SCN: NEGATIVE
CHLORIDE SERPL-SCNC: 113 MMOL/L (ref 94–109)
CO2 SERPL-SCNC: 25 MMOL/L (ref 20–32)
COCAINE UR QL: NEGATIVE
CREAT SERPL-MCNC: 0.64 MG/DL (ref 0.52–1.04)
DIFFERENTIAL METHOD BLD: NORMAL
EOSINOPHIL # BLD AUTO: 0.2 10E9/L (ref 0–0.7)
EOSINOPHIL NFR BLD AUTO: 3 %
ERYTHROCYTE [DISTWIDTH] IN BLOOD BY AUTOMATED COUNT: 13.8 % (ref 10–15)
ETHANOL UR QL SCN: POSITIVE
GFR SERPL CREATININE-BSD FRML MDRD: >90 ML/MIN/{1.73_M2}
GGT SERPL-CCNC: 16 U/L (ref 0–40)
GLUCOSE SERPL-MCNC: 93 MG/DL (ref 70–99)
HCG UR QL: NEGATIVE
HCT VFR BLD AUTO: 39.3 % (ref 35–47)
HGB BLD-MCNC: 12.9 G/DL (ref 11.7–15.7)
IMM GRANULOCYTES # BLD: 0 10E9/L (ref 0–0.4)
IMM GRANULOCYTES NFR BLD: 0 %
LYMPHOCYTES # BLD AUTO: 2 10E9/L (ref 0.8–5.3)
LYMPHOCYTES NFR BLD AUTO: 37.5 %
MCH RBC QN AUTO: 28.2 PG (ref 26.5–33)
MCHC RBC AUTO-ENTMCNC: 32.8 G/DL (ref 31.5–36.5)
MCV RBC AUTO: 86 FL (ref 78–100)
MONOCYTES # BLD AUTO: 0.3 10E9/L (ref 0–1.3)
MONOCYTES NFR BLD AUTO: 4.9 %
NEUTROPHILS # BLD AUTO: 2.9 10E9/L (ref 1.6–8.3)
NEUTROPHILS NFR BLD AUTO: 54 %
NRBC # BLD AUTO: 0 10*3/UL
NRBC BLD AUTO-RTO: 0 /100
OPIATES UR QL SCN: NEGATIVE
PLATELET # BLD AUTO: 413 10E9/L (ref 150–450)
POTASSIUM SERPL-SCNC: 3.7 MMOL/L (ref 3.4–5.3)
PROT SERPL-MCNC: 7.3 G/DL (ref 6.8–8.8)
RBC # BLD AUTO: 4.58 10E12/L (ref 3.8–5.2)
SARS-COV-2 PCR COMMENT: NORMAL
SARS-COV-2 RNA SPEC QL NAA+PROBE: NEGATIVE
SARS-COV-2 RNA SPEC QL NAA+PROBE: NORMAL
SODIUM SERPL-SCNC: 146 MMOL/L (ref 133–144)
SPECIMEN SOURCE: NORMAL
SPECIMEN SOURCE: NORMAL
TSH SERPL DL<=0.005 MIU/L-ACNC: 1.09 MU/L (ref 0.4–4)
VIT B12 SERPL-MCNC: 159 PG/ML (ref 193–986)
WBC # BLD AUTO: 5.3 10E9/L (ref 4–11)

## 2020-06-10 PROCEDURE — U0003 INFECTIOUS AGENT DETECTION BY NUCLEIC ACID (DNA OR RNA); SEVERE ACUTE RESPIRATORY SYNDROME CORONAVIRUS 2 (SARS-COV-2) (CORONAVIRUS DISEASE [COVID-19]), AMPLIFIED PROBE TECHNIQUE, MAKING USE OF HIGH THROUGHPUT TECHNOLOGIES AS DESCRIBED BY CMS-2020-01-R: HCPCS | Performed by: EMERGENCY MEDICINE

## 2020-06-10 PROCEDURE — 80320 DRUG SCREEN QUANTALCOHOLS: CPT | Performed by: FAMILY MEDICINE

## 2020-06-10 PROCEDURE — 81025 URINE PREGNANCY TEST: CPT | Performed by: FAMILY MEDICINE

## 2020-06-10 PROCEDURE — 82977 ASSAY OF GGT: CPT | Performed by: EMERGENCY MEDICINE

## 2020-06-10 PROCEDURE — 84443 ASSAY THYROID STIM HORMONE: CPT | Performed by: EMERGENCY MEDICINE

## 2020-06-10 PROCEDURE — 82607 VITAMIN B-12: CPT | Performed by: EMERGENCY MEDICINE

## 2020-06-10 PROCEDURE — 99285 EMERGENCY DEPT VISIT HI MDM: CPT | Mod: Z6 | Performed by: EMERGENCY MEDICINE

## 2020-06-10 PROCEDURE — 25000132 ZZH RX MED GY IP 250 OP 250 PS 637: Performed by: PSYCHIATRY & NEUROLOGY

## 2020-06-10 PROCEDURE — 80307 DRUG TEST PRSMV CHEM ANLYZR: CPT | Performed by: FAMILY MEDICINE

## 2020-06-10 PROCEDURE — 12800008 ZZH R&B CD ADULT

## 2020-06-10 PROCEDURE — 80053 COMPREHEN METABOLIC PANEL: CPT | Performed by: EMERGENCY MEDICINE

## 2020-06-10 PROCEDURE — 82075 ASSAY OF BREATH ETHANOL: CPT

## 2020-06-10 PROCEDURE — C9803 HOPD COVID-19 SPEC COLLECT: HCPCS

## 2020-06-10 PROCEDURE — HZ2ZZZZ DETOXIFICATION SERVICES FOR SUBSTANCE ABUSE TREATMENT: ICD-10-PCS | Performed by: PSYCHIATRY & NEUROLOGY

## 2020-06-10 PROCEDURE — 85025 COMPLETE CBC W/AUTO DIFF WBC: CPT | Performed by: EMERGENCY MEDICINE

## 2020-06-10 PROCEDURE — 99285 EMERGENCY DEPT VISIT HI MDM: CPT | Mod: 25

## 2020-06-10 RX ORDER — DIAZEPAM 5 MG
5-20 TABLET ORAL EVERY 30 MIN PRN
Status: DISCONTINUED | OUTPATIENT
Start: 2020-06-10 | End: 2020-06-12 | Stop reason: HOSPADM

## 2020-06-10 RX ORDER — HYDROXYZINE HYDROCHLORIDE 50 MG/1
50 TABLET, FILM COATED ORAL 3 TIMES DAILY PRN
Status: ON HOLD | COMMUNITY
End: 2020-06-12

## 2020-06-10 RX ORDER — FOLIC ACID 1 MG/1
1 TABLET ORAL DAILY
Status: DISCONTINUED | OUTPATIENT
Start: 2020-06-11 | End: 2020-06-12 | Stop reason: HOSPADM

## 2020-06-10 RX ORDER — PRAZOSIN HYDROCHLORIDE 1 MG/1
1 CAPSULE ORAL AT BEDTIME
Status: ON HOLD | COMMUNITY
End: 2020-06-12

## 2020-06-10 RX ORDER — BUPROPION HYDROCHLORIDE 300 MG/1
300 TABLET ORAL EVERY MORNING
Status: ON HOLD | COMMUNITY
End: 2020-06-12

## 2020-06-10 RX ORDER — DISULFIRAM 250 MG/1
250 TABLET ORAL DAILY
Status: ON HOLD | COMMUNITY
End: 2020-06-12

## 2020-06-10 RX ORDER — ONDANSETRON 4 MG/1
4 TABLET, ORALLY DISINTEGRATING ORAL EVERY 6 HOURS PRN
Status: DISCONTINUED | OUTPATIENT
Start: 2020-06-10 | End: 2020-06-12 | Stop reason: HOSPADM

## 2020-06-10 RX ORDER — ATENOLOL 50 MG/1
50 TABLET ORAL DAILY PRN
Status: DISCONTINUED | OUTPATIENT
Start: 2020-06-10 | End: 2020-06-12 | Stop reason: HOSPADM

## 2020-06-10 RX ORDER — GABAPENTIN 600 MG/1
600 TABLET ORAL 3 TIMES DAILY
Status: DISCONTINUED | OUTPATIENT
Start: 2020-06-10 | End: 2020-06-12 | Stop reason: HOSPADM

## 2020-06-10 RX ORDER — LOPERAMIDE HCL 2 MG
2 CAPSULE ORAL 4 TIMES DAILY PRN
Status: DISCONTINUED | OUTPATIENT
Start: 2020-06-10 | End: 2020-06-12 | Stop reason: HOSPADM

## 2020-06-10 RX ORDER — PRAZOSIN HYDROCHLORIDE 1 MG/1
1 CAPSULE ORAL AT BEDTIME
Status: DISCONTINUED | OUTPATIENT
Start: 2020-06-10 | End: 2020-06-12 | Stop reason: HOSPADM

## 2020-06-10 RX ORDER — GABAPENTIN 600 MG/1
600 TABLET ORAL 3 TIMES DAILY
Status: ON HOLD | COMMUNITY
End: 2020-06-12

## 2020-06-10 RX ORDER — BISACODYL 10 MG
10 SUPPOSITORY, RECTAL RECTAL DAILY PRN
Status: DISCONTINUED | OUTPATIENT
Start: 2020-06-10 | End: 2020-06-12 | Stop reason: HOSPADM

## 2020-06-10 RX ORDER — MULTIPLE VITAMINS W/ MINERALS TAB 9MG-400MCG
1 TAB ORAL DAILY
Status: DISCONTINUED | OUTPATIENT
Start: 2020-06-11 | End: 2020-06-12 | Stop reason: HOSPADM

## 2020-06-10 RX ORDER — ALUMINA, MAGNESIA, AND SIMETHICONE 2400; 2400; 240 MG/30ML; MG/30ML; MG/30ML
30 SUSPENSION ORAL EVERY 4 HOURS PRN
Status: DISCONTINUED | OUTPATIENT
Start: 2020-06-10 | End: 2020-06-12 | Stop reason: HOSPADM

## 2020-06-10 RX ORDER — HYDROXYZINE HYDROCHLORIDE 50 MG/1
50 TABLET, FILM COATED ORAL 3 TIMES DAILY PRN
Status: DISCONTINUED | OUTPATIENT
Start: 2020-06-10 | End: 2020-06-12 | Stop reason: HOSPADM

## 2020-06-10 RX ORDER — LANOLIN ALCOHOL/MO/W.PET/CERES
100 CREAM (GRAM) TOPICAL DAILY
Status: DISCONTINUED | OUTPATIENT
Start: 2020-06-11 | End: 2020-06-12 | Stop reason: HOSPADM

## 2020-06-10 RX ORDER — ACETAMINOPHEN 325 MG/1
650 TABLET ORAL EVERY 4 HOURS PRN
Status: DISCONTINUED | OUTPATIENT
Start: 2020-06-10 | End: 2020-06-12 | Stop reason: HOSPADM

## 2020-06-10 RX ADMIN — OMEPRAZOLE 20 MG: 20 CAPSULE, DELAYED RELEASE ORAL at 20:24

## 2020-06-10 RX ADMIN — DIAZEPAM 10 MG: 5 TABLET ORAL at 20:25

## 2020-06-10 RX ADMIN — ACETAMINOPHEN 650 MG: 325 TABLET, FILM COATED ORAL at 20:25

## 2020-06-10 RX ADMIN — DIAZEPAM 10 MG: 5 TABLET ORAL at 17:26

## 2020-06-10 RX ADMIN — PRAZOSIN HYDROCHLORIDE 1 MG: 1 CAPSULE ORAL at 20:24

## 2020-06-10 RX ADMIN — GABAPENTIN 600 MG: 600 TABLET, FILM COATED ORAL at 20:25

## 2020-06-10 ASSESSMENT — MIFFLIN-ST. JEOR
SCORE: 1620.23
SCORE: 1620.23

## 2020-06-10 ASSESSMENT — ACTIVITIES OF DAILY LIVING (ADL)
COGNITION: 0 - NO COGNITION ISSUES REPORTED
RETIRED_COMMUNICATION: 0-->UNDERSTANDS/COMMUNICATES WITHOUT DIFFICULTY
SWALLOWING: 0-->SWALLOWS FOODS/LIQUIDS WITHOUT DIFFICULTY
BATHING: 0-->INDEPENDENT
DRESS: 0-->INDEPENDENT
TOILETING: 0-->INDEPENDENT
RETIRED_EATING: 0-->INDEPENDENT
FALL_HISTORY_WITHIN_LAST_SIX_MONTHS: NO
TRANSFERRING: 0-->INDEPENDENT
AMBULATION: 0-->INDEPENDENT

## 2020-06-10 ASSESSMENT — ENCOUNTER SYMPTOMS
COLOR CHANGE: 0
CHILLS: 0
CONFUSION: 0
BRUISES/BLEEDS EASILY: 0
POLYDIPSIA: 0
EYE REDNESS: 0
HEADACHES: 0
FEVER: 0
DIFFICULTY URINATING: 0
DYSPHORIC MOOD: 1
NECK STIFFNESS: 0
ADENOPATHY: 0
NECK PAIN: 0
ABDOMINAL PAIN: 0
NERVOUS/ANXIOUS: 0
SHORTNESS OF BREATH: 0
VOMITING: 0
NAUSEA: 0
ARTHRALGIAS: 0

## 2020-06-10 NOTE — ED NOTES
".  ED to Behavioral Floor Handoff    SITUATION  Viviana Massey is a 40 year old female who speaks English and lives is homeless alone The patient arrived in the ED by private car from home with a complaint of Alcohol Intoxication (Patient here for detox from alcohol, drinks 6 cans of 25 oz beer/day, last drink was an hour  prior to ED visit.  )  .The patient's current symptoms started/worsened 6 day(s) ago and during this time the symptoms have increased.   In the ED, pt was diagnosed with   Final diagnoses:   Alcohol dependence with uncomplicated intoxication (H)        Initial vitals were: BP: 119/72  Pulse: 87  Temp: 96.5  F (35.8  C)  Resp: 16  Height: 162.6 cm (5' 4\")  Weight: 97.5 kg (215 lb)  SpO2: 97 %   --------  Is the patient diabetic? No   If yes, last blood glucose? --     If yes, was this treated in the ED? --  --------  Is the patient inebriated (ETOH) No or Impaired on other substances? Yes  MSSA done? Yes  Last MSSA score: 05  Were withdrawal symptoms treated? No  Does the patient have a seizure history? No. If yes, date of most recent seizure--  --------  Is the patient patient experiencing suicidal ideation? denies current or recent suicidal ideation     Homicidal ideation? denies current or recent homicidal ideation or behaviors.    Self-injurious behavior/urges? denies current or recent self injurious behavior or ideation.  ------  Was pt aggressive in the ED No  Was a code called No  Is the pt now cooperative? Yes  -------  Meds given in ED: Medications - No data to display   Family present during ED course? No  Family currently present? No    BACKGROUND  Does the patient have a cognitive impairment or developmental disability? No  Allergies:   Allergies   Allergen Reactions     Imitrex [Sumatriptan] Other (See Comments)     Pain in jaw and shoulder. Flushing.     Topamax [Topiramate] GI Disturbance     Trazodone Other (See Comments)     Headache, dizziness.   .   Social demographics are "   Social History     Socioeconomic History     Marital status: Single     Spouse name: None     Number of children: None     Years of education: None     Highest education level: None   Occupational History     None   Social Needs     Financial resource strain: None     Food insecurity     Worry: None     Inability: None     Transportation needs     Medical: None     Non-medical: None   Tobacco Use     Smoking status: Current Every Day Smoker     Packs/day: 1.00     Smokeless tobacco: Never Used   Substance and Sexual Activity     Alcohol use: Yes     Comment: 6 cans of beer daily     Drug use: Yes     Types: Methamphetamines     Comment: Benzo from off the street.  Takes as pill.  Last used meth on Tuesday- smokes.     Sexual activity: Yes     Partners: Male     Birth control/protection: None   Lifestyle     Physical activity     Days per week: None     Minutes per session: None     Stress: None   Relationships     Social connections     Talks on phone: None     Gets together: None     Attends Mosque service: None     Active member of club or organization: None     Attends meetings of clubs or organizations: None     Relationship status: None     Intimate partner violence     Fear of current or ex partner: None     Emotionally abused: None     Physically abused: None     Forced sexual activity: None   Other Topics Concern     None   Social History Narrative     None        ASSESSMENT  Labs results   Labs Ordered and Resulted from Time of ED Arrival Up to the Time of Departure from the ED   DRUG ABUSE SCREEN 6 CHEM DEP URINE ([facility]) - Abnormal; Notable for the following components:       Result Value    Ethanol Qual Urine Positive (*)     All other components within normal limits   COMPREHENSIVE METABOLIC PANEL - Abnormal; Notable for the following components:    Sodium 146 (*)     Chloride 113 (*)     Calcium 8.0 (*)     All other components within normal limits   ALCOHOL BREATH TEST POCT - Abnormal   HCG  "QUALITATIVE URINE   CBC WITH PLATELETS DIFFERENTIAL   COVID-19 VIRUS (CORONAVIRUS) BY PCR      Imaging Studies: No results found for this or any previous visit (from the past 24 hour(s)).   Most recent vital signs /72   Pulse 73   Temp 97.9  F (36.6  C) (Oral)   Resp 14   Ht 1.626 m (5' 4\")   Wt 97.5 kg (215 lb)   LMP 05/01/2020   SpO2 97%   Breastfeeding No   BMI 36.90 kg/m     Abnormal labs/tests/findings requiring intervention:---   Pain control: pt had none  Nausea control: pt had none    RECOMMENDATION  Are any infection precautions needed (MRSA, VRE, etc.)? No If yes, what infection? --  ---  Does the patient have mobility issues? independently. If yes, what device does the pt use? ---  ---  Is patient on 72 hour hold or commitment? No If on 72 hour hold, have hold and rights been given to patient? N/A  Are admitting orders written if after 10 p.m. ?N/A  Tasks needing to be completed:---     Sweetie Reina RN   ascom--    [phone] [facility] ED   [phone] [facility] ED  "

## 2020-06-10 NOTE — TELEPHONE ENCOUNTER
S:  12:15 PM  Call from  ED requesting detox bed for 39 YO F  W/ ETOH withdrawl    B:  Pt relapsed 6 days ago drinking a 6 pack of 25 oz. Beers daily. Last drink a few hours ago. Denies acute or chronic MH or medical issues. Denies hx of ETOH WD or DT's       COVID-19 swab collected  Alcohol Breath Test-.17  Utox-pos for ethanol  CMP-Calcium L 8   Na-H 146   Chloride  H 113  CBC w/plat-all WNL  HCG-NEG    A:  vol    R:  Patient cleared and ready for detox  bed placement: Yes     R:  2:16 PM  Patient accepted by Dr. James.  Placed in 3A queue-disposition given to 3A charge nurse, notified patient's ED RN they can call and do nurse to nurse after 3 PM and patient can transfer to 3A at 3:30 PM.

## 2020-06-10 NOTE — PROGRESS NOTES
06/10/20 1604   Patient Belongings   Did you bring any home meds/supplements to the hospital?  Yes   Disposition of meds  Sent to security/pharmacy per site process;Returned to patient   Patient Belongings locker;returned to patient at discharge   Patient Belongings Remaining with Patient other (see comments);cell phone/electronics   Patient Belongings Put in Hospital Secure Location (Security or Locker, etc.) other (see comments)   Belongings Search Yes   Clothing Search Yes   Second Staff Dee HOLLIS,   Comment see note   Storage Bin   Black purse w/loose paper other stuffs, soda pop  Medical Bin   3x Cell phone, 2x wallets, candies bar, 3x lighters, cigarettes,   Security Envelope #[id number]  [place] ebt card, unlimited debit visa, identification card, social security, visa debit, wiscousin MasterCard  A             Admission:  I am responsible for any personal items that are not sent to the safe or pharmacy.  [facility] is not responsible for loss, theft or damage of any property in my possession.  Signature:  _________________________________ Date: _______  Time: _____                                              Staff Signature:  ____________________________ Date: ________  Time: _____      2nd Staff person, if patient is unable/unwilling to sign:    Signature: ________________________________ Date: ________  Time: _____   Discharge:  [facility] has returned all of my personal belongings:  Signature: _________________________________ Date: ________  Time: _____                                          Staff Signature:  ____________________________ Date: ________  Time: _____

## 2020-06-10 NOTE — ED PROVIDER NOTES
[facility] EMERGENCY DEPARTMENT ([facility])    6/10/20        History     Chief Complaint   Patient presents with     Alcohol Intoxication     Patient here for detox from alcohol, drinks 6 cans of 25 oz beer/day, last drink was an hour  prior to ED visit.       The history is provided by the patient and medical records.     Viviana Massey is a 40 year old female with a past medical history significant for alcohol abuse, polysubstance abuse, depression, anxiety, PTSD and obesity s/p gastric bypass (2012) who presents here to the Emergency Department seeking detox from alcohol.  Patient has called ahead for a bed on detox.  Patient reports that she drinks 6 cans of 25 ounce beer per day.  States she relapsed and has been drinking this heavily for 5 to 6 days.  States her last drink was 1 hour PTA.  Reports that she also uses methamphetamine and last used a few days ago.  Denies alcohol withdrawal seizures.  Patient denies suicidal or homicidal ideations.  She denies any cough, nasal congestion, fevers.  No pain either.    I have reviewed the Medications, Allergies, Past Medical and Surgical History, and Social History in the [employer] system.  PAST MEDICAL HISTORY:   Past Medical History:   Diagnosis Date     Anxiety      Arthritis      Depressive disorder      Insomnia      Substance abuse (H)        PAST SURGICAL HISTORY:   Past Surgical History:   Procedure Laterality Date     GASTRIC BYPASS  2012     GI SURGERY  2013    gastric bipass     GYN SURGERY      D and C.       Past medical history, past surgical history, medications, and allergies were reviewed with the patient. Additional pertinent items: None    FAMILY HISTORY:   Family History   Problem Relation Age of Onset     Esophageal Cancer Father      Alcoholism Father      Cancer Maternal Grandmother      Lymphoma Maternal Uncle      Cancer Paternal Grandfather      Arthritis Mother      Alcoholism Mother      Alcoholism Sister      Alcoholism Brother         SOCIAL HISTORY:   Social History     Tobacco Use     Smoking status: Current Every Day Smoker     Packs/day: 1.00     Smokeless tobacco: Never Used   Substance Use Topics     Alcohol use: Yes     Comment: 6 cans of beer daily     Social history was reviewed with the patient. Additional pertinent items: None      Current Discharge Medication List      CONTINUE these medications which have NOT CHANGED    Details   buPROPion (WELLBUTRIN XL) 300 MG 24 hr tablet Take 300 mg by mouth every morning      disulfiram (ANTABUSE) 250 MG tablet Take 250 mg by mouth daily      gabapentin (NEURONTIN) 600 MG tablet Take 600 mg by mouth 3 times daily      hydrOXYzine (ATARAX) 50 MG tablet Take 50 mg by mouth 3 times daily as needed for anxiety      omeprazole (PRILOSEC) 20 MG DR capsule Take 20 mg by mouth 2 times daily      prazosin (MINIPRESS) 1 MG capsule Take 1 mg by mouth At Bedtime                Allergies   Allergen Reactions     Imitrex [Sumatriptan] Other (See Comments)     Pain in jaw and shoulder. Flushing.     Topamax [Topiramate] GI Disturbance     Trazodone Other (See Comments)     Headache, dizziness.        Review of Systems   Constitutional: Negative for chills and fever.   HENT: Negative for congestion.    Eyes: Negative for redness.   Respiratory: Negative for shortness of breath.    Cardiovascular: Negative for chest pain.   Gastrointestinal: Negative for abdominal pain, nausea and vomiting.   Endocrine: Negative for polydipsia and polyuria.   Genitourinary: Negative for difficulty urinating.   Musculoskeletal: Negative for arthralgias, neck pain and neck stiffness.   Skin: Negative for color change.   Allergic/Immunologic: Negative for immunocompromised state.   Neurological: Negative for headaches.   Hematological: Negative for adenopathy. Does not bruise/bleed easily.   Psychiatric/Behavioral: Positive for dysphoric mood. Negative for confusion and suicidal ideas. The patient is not nervous/anxious.   "  All other systems reviewed and are negative.      Physical Exam   BP: 119/72  Pulse: 87  Temp: 96.5  F (35.8  C)  Resp: 16  Height: 162.6 cm (5' 4\")  Weight: 97.5 kg (215 lb)  SpO2: 97 %      Physical Exam  Vitals signs and nursing note reviewed.   Constitutional:       General: She is not in acute distress.     Appearance: Normal appearance. She is not diaphoretic.   HENT:      Head: Normocephalic and atraumatic.      Nose: Nose normal.      Mouth/Throat:      Mouth: Mucous membranes are moist.      Pharynx: Oropharynx is clear. No oropharyngeal exudate.   Eyes:      General: No scleral icterus.     Extraocular Movements: Extraocular movements intact.      Conjunctiva/sclera: Conjunctivae normal.      Pupils: Pupils are equal, round, and reactive to light.   Neck:      Musculoskeletal: Normal range of motion and neck supple.   Cardiovascular:      Rate and Rhythm: Normal rate.      Pulses: Normal pulses.      Heart sounds: Normal heart sounds.   Pulmonary:      Effort: Pulmonary effort is normal. No respiratory distress.      Breath sounds: Normal breath sounds. No wheezing or rhonchi.   Abdominal:      Palpations: Abdomen is soft.      Tenderness: There is no abdominal tenderness. There is no right CVA tenderness, left CVA tenderness or guarding.   Musculoskeletal: Normal range of motion.         General: No tenderness.   Skin:     General: Skin is warm.      Findings: No rash.   Neurological:      General: No focal deficit present.      Mental Status: She is alert and oriented to person, place, and time.      Coordination: Coordination normal.   Psychiatric:         Mood and Affect: Mood normal.         Behavior: Behavior normal.      Comments: No suicidal or homicidal ideations.  No signs of active hallucinations.         ED Course        Procedures                           Results for orders placed or performed during the hospital encounter of 06/10/20 (from the past 24 hour(s))   Alcohol breath test POCT "   Result Value Ref Range    Alcohol Breath Test 0.170- weak blow 0.00 - 0.01   Drug abuse screen 6 urine (tox)   Result Value Ref Range    Amphetamine Qual Urine Negative NEG^Negative    Barbiturates Qual Urine Negative NEG^Negative    Benzodiazepine Qual Urine Negative NEG^Negative    Cannabinoids Qual Urine Negative NEG^Negative    Cocaine Qual Urine Negative NEG^Negative    Ethanol Qual Urine Positive (A) NEG^Negative    Opiates Qualitative Urine Negative NEG^Negative   HCG qualitative urine   Result Value Ref Range    HCG Qual Urine Negative NEG^Negative   Comprehensive metabolic panel   Result Value Ref Range    Sodium 146 (H) 133 - 144 mmol/L    Potassium 3.7 3.4 - 5.3 mmol/L    Chloride 113 (H) 94 - 109 mmol/L    Carbon Dioxide 25 20 - 32 mmol/L    Anion Gap 8 3 - 14 mmol/L    Glucose 93 70 - 99 mg/dL    Urea Nitrogen 8 7 - 30 mg/dL    Creatinine 0.64 0.52 - 1.04 mg/dL    GFR Estimate >90 >60 mL/min/[1.73_m2]    GFR Estimate If Black >90 >60 mL/min/[1.73_m2]    Calcium 8.0 (L) 8.5 - 10.1 mg/dL    Bilirubin Total 0.3 0.2 - 1.3 mg/dL    Albumin 3.4 3.4 - 5.0 g/dL    Protein Total 7.3 6.8 - 8.8 g/dL    Alkaline Phosphatase 68 40 - 150 U/L    ALT 20 0 - 50 U/L    AST 30 0 - 45 U/L   CBC with platelets differential   Result Value Ref Range    WBC 5.3 4.0 - 11.0 10e9/L    RBC Count 4.58 3.8 - 5.2 10e12/L    Hemoglobin 12.9 11.7 - 15.7 g/dL    Hematocrit 39.3 35.0 - 47.0 %    MCV 86 78 - 100 fl    MCH 28.2 26.5 - 33.0 pg    MCHC 32.8 31.5 - 36.5 g/dL    RDW 13.8 10.0 - 15.0 %    Platelet Count 413 150 - 450 10e9/L    Diff Method Automated Method     % Neutrophils 54.0 %    % Lymphocytes 37.5 %    % Monocytes 4.9 %    % Eosinophils 3.0 %    % Basophils 0.6 %    % Immature Granulocytes 0.0 %    Nucleated RBCs 0 0 /100    Absolute Neutrophil 2.9 1.6 - 8.3 10e9/L    Absolute Lymphocytes 2.0 0.8 - 5.3 10e9/L    Absolute Monocytes 0.3 0.0 - 1.3 10e9/L    Absolute Eosinophils 0.2 0.0 - 0.7 10e9/L    Absolute Basophils 0.0  0.0 - 0.2 10e9/L    Abs Immature Granulocytes 0.0 0 - 0.4 10e9/L    Absolute Nucleated RBC 0.0    Asymptomatic COVID-19 Virus (Coronavirus) by PCR    Specimen: Nasopharyngeal   Result Value Ref Range    COVID-19 Virus PCR to [facility] - Source Nasopharyngeal     COVID-19 Virus PCR to [facility] - Result       Test received-See reflex to [facility] test SARS CoV2 (COVID-19) Virus RT-PCR   SARS-CoV-2 COVID-19 Virus (Coronavirus) RT-PCR Nasopharyngeal    Specimen: Nasopharyngeal   Result Value Ref Range    SARS-CoV-2 Virus Specimen Source Nasopharyngeal     SARS-CoV-2 PCR Result NEGATIVE     SARS-CoV-2 PCR Comment       This automated, real-time RT-PCR assay by [employer] on the [employer] has   been given Emergency Use Authorization (EUA) for the in vitro qualitative detection of RNA   from the SARS-CoV2 virus in nasopharyngeal swabs in viral transport medium from patients   with signs and symptoms of infection who are suspected of COVID-19. The performance is   unknown in asymptomatic patients.       Medications   gabapentin (NEURONTIN) tablet 600 mg (has no administration in time range)   hydrOXYzine (ATARAX) tablet 50 mg (has no administration in time range)   omeprazole (priLOSEC) CR capsule 20 mg (has no administration in time range)   prazosin (MINIPRESS) capsule 1 mg (has no administration in time range)   diazepam (VALIUM) tablet 5-20 mg (has no administration in time range)   atenolol (TENORMIN) tablet 50 mg (has no administration in time range)   vitamin B1 (THIAMINE) tablet 100 mg (has no administration in time range)   folic acid (FOLVITE) tablet 1 mg (has no administration in time range)   multivitamin w/minerals (THERA-VIT-M) tablet 1 tablet (has no administration in time range)   bisacodyl (DULCOLAX) Suppository 10 mg (has no administration in time range)   magnesium hydroxide (MILK OF MAGNESIA) suspension 30 mL (has no administration in time range)   alum & mag hydroxide-simethicone (MAALOX  ES)  suspension 30 mL (has no administration in time range)   acetaminophen (TYLENOL) tablet 650 mg (has no administration in time range)   nicotine polacrilex (NICORETTE) gum 4-8 mg (has no administration in time range)             Assessments & Plan (with Medical Decision Making)   This is a 40 year old female presenting for willingness to check in to detox for alcohol dependence.  Laboratory studies were obtained.  Alcohol breathalyzer test was 0.17.  Urine pregnancy test was negative.  Patient is vitally stable and cleared for detox.    Patient's laboratory studies returned without any evidence of leukocytosis, WBC is normal at 5,300. There is no evidence of anemia, hemoglobin is normal at 12.9.  Electrolytes show no evidence of dehydration, creatinine is normal at 0.64.  At this time she is stable for detox admission.    This part of the medical record was transcribed by Lopez Muñiz, Medical Scribe, from a dictation done by Tila Tam MD.     I have reviewed the nursing notes.    I have reviewed the findings, diagnosis, plan and need for follow up with the patient.    Current Discharge Medication List          Final diagnoses:   Alcohol dependence with uncomplicated intoxication (H)     ILopez, am serving as a trained medical scribe to document services personally performed by Tila Tam MD, based on the provider's statements to me.   Tila BERRIOS MD, was physically present and have reviewed and verified the accuracy of this note documented by Lopez Muñiz.    6/10/2020   [facility], EMERGENCY DEPARTMENT     Tila Tam MD  06/10/20 [id number]

## 2020-06-10 NOTE — PHARMACY-ADMISSION MEDICATION HISTORY
Admission Medication History Completed by Pharmacy    See [facility] Admission Navigator for allergy information, preferred outpatient pharmacy, prior to admission medications and immunization status.     Medication history sources:  Care Everywhere ([facility]) and [facility] dispense history only    Changes made to PTA medication list (reason)  Added:   - Antabuse, prazosin (new start May 2020 per [facility])  Deleted:   - Abilify 5 mg daily (no dispense history, entry from 2018)  - folic acid 1 mg daily (no dispense history, entry from 2018)  Changed:   - bupropion  mg BID--> mg daily (per [facility] Everywhere)  - hydroxyzine 25 mg-->50 mg ([facility])  - omeprazole 20 mg daily-->BID ([facility], Care Everywhere)    Additional medication history information:   - Patient not interviewed as part of this medication history - attempted to interview in ED but patient not available. Please discuss any OTC/non-prescription medication use and last doses with patient that may not be reflected in the list below.    Per [facility] :  - gabapentin 600 mg tabs #90 for 30 days supply last filled 4/13/2020      Prior to Admission medications    Medication Sig Last Dose Taking? Auth Provider   buPROPion (WELLBUTRIN XL) 300 MG 24 hr tablet Take 300 mg by mouth every morning 6/9/2020 Yes Unknown, Entered By History   disulfiram (ANTABUSE) 250 MG tablet Take 250 mg by mouth daily  Yes Unknown, Entered By History   gabapentin (NEURONTIN) 600 MG tablet Take 600 mg by mouth 3 times daily 6/9/2020 Yes Unknown, Entered By History   hydrOXYzine (ATARAX) 50 MG tablet Take 50 mg by mouth 3 times daily as needed for anxiety PRN Yes Unknown, Entered By History   omeprazole (PRILOSEC) 20 MG DR capsule Take 20 mg by mouth 2 times daily 6/10/2020 Yes Unknown, Entered By History   prazosin (MINIPRESS) 1 MG capsule Take 1 mg by mouth At Bedtime  Yes Unknown, Entered By History       Date completed: 06/10/20    Medication history completed by:    Jorgito Mckeon, Ana  [facility]  Emergency Department: Ascom *[id number]

## 2020-06-10 NOTE — PLAN OF CARE
"WM Martine Shilpa 40/F      S = Situation:     Voluntary admit from [place] ED for alcohol, assigned to Jacob      B  = Background:     Pt last admitted to 3A in December 2017, numerous CD treatments, currently at [facility] in [place]; plans to return there after discharge    Pt states drinking 6-pack of malt liquor daily for 5 days, last use PTA to ED \"drank in the car all the way from [place]\"    Pt endorses meth use (UTOX is negative for meth), last used 6/6 or 6/7 (inhales); stated switching to alcohol when meth got too hard to find    States suicide attempt by overdose January 2020    Hx gastric bypass, PTSD, depression/anxiety    A  =  Assessment:     MSSA = 6 at intake  AST 30, ALT 20    Endorses anxiety, calm and cooperative with intake    R =   Request or Recommendation:     MSSA with valium, withdrawal precautions, IM consult    Trazodone not ordered due to allergy  "

## 2020-06-10 NOTE — ED TRIAGE NOTES
Patient here for detox from alcohol, drinks 6 cans of 25 oz beer/day, last drink was an hour  prior to ED visit.  NO Hx of withdrawal seizures but has DT's. Patient admits to using Meth then switch to alcohol a week ago. Patient denies any SI/HI

## 2020-06-11 LAB
CHOLEST SERPL-MCNC: 118 MG/DL
HDLC SERPL-MCNC: 55 MG/DL
LDLC SERPL CALC-MCNC: 44 MG/DL
NONHDLC SERPL-MCNC: 63 MG/DL
TRIGL SERPL-MCNC: 96 MG/DL

## 2020-06-11 PROCEDURE — 25000132 ZZH RX MED GY IP 250 OP 250 PS 637: Performed by: PSYCHIATRY & NEUROLOGY

## 2020-06-11 PROCEDURE — 12800008 ZZH R&B CD ADULT

## 2020-06-11 PROCEDURE — 36415 COLL VENOUS BLD VENIPUNCTURE: CPT | Performed by: PSYCHIATRY & NEUROLOGY

## 2020-06-11 PROCEDURE — 99223 1ST HOSP IP/OBS HIGH 75: CPT | Mod: 95 | Performed by: PSYCHIATRY & NEUROLOGY

## 2020-06-11 PROCEDURE — 25000128 H RX IP 250 OP 636: Performed by: PSYCHIATRY & NEUROLOGY

## 2020-06-11 PROCEDURE — 25000132 ZZH RX MED GY IP 250 OP 250 PS 637: Performed by: NURSE PRACTITIONER

## 2020-06-11 PROCEDURE — 99221 1ST HOSP IP/OBS SF/LOW 40: CPT | Performed by: PHYSICIAN ASSISTANT

## 2020-06-11 PROCEDURE — 99207 ZZC CONSULT E&M CHANGED TO INITIAL LEVEL: CPT | Performed by: PHYSICIAN ASSISTANT

## 2020-06-11 PROCEDURE — 80061 LIPID PANEL: CPT | Performed by: PSYCHIATRY & NEUROLOGY

## 2020-06-11 RX ORDER — CYANOCOBALAMIN 1000 UG/ML
1000 INJECTION, SOLUTION INTRAMUSCULAR; SUBCUTANEOUS
Status: DISCONTINUED | OUTPATIENT
Start: 2020-06-11 | End: 2020-06-12 | Stop reason: HOSPADM

## 2020-06-11 RX ADMIN — THIAMINE HCL TAB 100 MG 100 MG: 100 TAB at 08:36

## 2020-06-11 RX ADMIN — MULTIPLE VITAMINS W/ MINERALS TAB 1 TABLET: TAB at 08:36

## 2020-06-11 RX ADMIN — HYDROXYZINE HYDROCHLORIDE 50 MG: 50 TABLET, FILM COATED ORAL at 08:54

## 2020-06-11 RX ADMIN — OMEPRAZOLE 20 MG: 20 CAPSULE, DELAYED RELEASE ORAL at 08:36

## 2020-06-11 RX ADMIN — OMEPRAZOLE 20 MG: 20 CAPSULE, DELAYED RELEASE ORAL at 20:45

## 2020-06-11 RX ADMIN — HYDROXYZINE HYDROCHLORIDE 50 MG: 50 TABLET, FILM COATED ORAL at 17:03

## 2020-06-11 RX ADMIN — GABAPENTIN 600 MG: 600 TABLET, FILM COATED ORAL at 14:38

## 2020-06-11 RX ADMIN — FOLIC ACID 1 MG: 1 TABLET ORAL at 08:36

## 2020-06-11 RX ADMIN — GABAPENTIN 600 MG: 600 TABLET, FILM COATED ORAL at 08:36

## 2020-06-11 RX ADMIN — PRAZOSIN HYDROCHLORIDE 1 MG: 1 CAPSULE ORAL at 20:45

## 2020-06-11 RX ADMIN — LOPERAMIDE HYDROCHLORIDE 2 MG: 2 CAPSULE ORAL at 20:45

## 2020-06-11 RX ADMIN — NICOTINE POLACRILEX 8 MG: 4 GUM, CHEWING ORAL at 21:13

## 2020-06-11 RX ADMIN — GABAPENTIN 600 MG: 600 TABLET, FILM COATED ORAL at 20:45

## 2020-06-11 RX ADMIN — CYANOCOBALAMIN 1000 MCG: 1000 INJECTION, SOLUTION INTRAMUSCULAR at 12:33

## 2020-06-11 ASSESSMENT — ACTIVITIES OF DAILY LIVING (ADL)
HYGIENE/GROOMING: INDEPENDENT
DRESS: INDEPENDENT
ORAL_HYGIENE: INDEPENDENT
ORAL_HYGIENE: INDEPENDENT
DRESS: INDEPENDENT
HYGIENE/GROOMING: INDEPENDENT

## 2020-06-11 NOTE — PLAN OF CARE
Continues in detox status on alcohol withdrawal protocol. MSSA scores 4 and 4.Appetite fair. Fluids taken well. Low energy. Spent most of the day resting in bed. Denies suicide ideation and thoughts of self harm. Will continue to monitor.

## 2020-06-11 NOTE — CONSULTS
"  [facility]  Internal Medicine Consult     Viviana Massey MRN# [id number]   Age: 40 year old YOB: 1980     Date of Admission: 6/10/2020  Date of Consult:  6/11/2020    Primary Care Provider: Tracey Lizama    Requesting Service: Psychiatry  Reason for Consult: General Medical Evaluation         Assessment and Recommendations:   Viviana Massey is a 40 year old female with a hx of polysubstance abuse, depression, anxiety, PTSD and obesity s/p gastric bypass (2012) who was admitted to [facility] station 3A seeking detox from alcohol.     # Alcohol abuse, dependence, acute withdrawal. Management per primary team, psychiatry. Lab work up unremarkable. VSS.  - Agree with MSSA using valium  - Agree with thiamine, folic acid, MVI    Depression, anxiety, PTSD. Management per primary team.     Obesity s/p gastric bypass (2012). No acute issues.         Internal Medicine will sign off at this time. Please notify if any intercurrent medical questions or concerns arise. Thank you for involving me in this patients care.       Vidhi Tellez PA-C  Hospitalist Service  [phone]           Chief Complaint:   \"Stomach ache\"         History of Present Illness:   Viviana Massey is a 40 year old female with a hx of polysubstance abuse, depression, anxiety, PTSD and obesity s/p gastric bypass (2012) who was admitted to [facility] station 3A seeking detox from alcohol.   Per ED note:  \" Patient has called ahead for a bed on detox.  Patient reports that she drinks 6 cans of 25 ounce beer per day.  States she relapsed and has been drinking this heavily for 5 to 6 days.  States her last drink was 1 hour PTA.  Reports that she also uses methamphetamine and last used a few days ago.  Denies alcohol withdrawal seizures.  Patient denies suicidal or homicidal ideations.  She denies any cough, nasal congestion, fevers.  No pain either.\"  Currently she complains of GI upset with stomach ache and diarrhea. " She also notes significant anxiety and a mild headache. She denies any chest pain, shortness of breath, difficulty breathing, cough, fever or mylagias. She notes that meth is her drug of choice and that she did use prior to admission. She has not injected in over a month, she mostly smokes. She also smokes 1+ pack per day of cigarettes.            Review of Systems:   A 10 point review of systems was performed and is negative unless otherwise noted in HPI.          Past Medical History:     Past Medical History:   Diagnosis Date     Anxiety      Arthritis      Depressive disorder      Insomnia      Substance abuse (H)             Past Surgical History:      Past Surgical History:   Procedure Laterality Date     GASTRIC BYPASS  2012     GI SURGERY  2013    gastric bipass     GYN SURGERY      D and C.             Family History:     Family History   Problem Relation Age of Onset     Esophageal Cancer Father      Alcoholism Father      Cancer Maternal Grandmother      Lymphoma Maternal Uncle      Cancer Paternal Grandfather      Arthritis Mother      Alcoholism Mother      Alcoholism Sister      Alcoholism Brother              Social History:     Social History     Tobacco Use     Smoking status: Current Every Day Smoker     Packs/day: 1.00     Smokeless tobacco: Never Used   Substance Use Topics     Alcohol use: Yes     Comment: 6 cans of beer daily             Medications:     Current Facility-Administered Medications   Medication     acetaminophen (TYLENOL) tablet 650 mg     alum & mag hydroxide-simethicone (MAALOX  ES) suspension 30 mL     atenolol (TENORMIN) tablet 50 mg     bisacodyl (DULCOLAX) Suppository 10 mg     diazepam (VALIUM) tablet 5-20 mg     folic acid (FOLVITE) tablet 1 mg     gabapentin (NEURONTIN) tablet 600 mg     hydrOXYzine (ATARAX) tablet 50 mg     loperamide (IMODIUM) capsule 2 mg     magnesium hydroxide (MILK OF MAGNESIA) suspension 30 mL     multivitamin w/minerals (THERA-VIT-M) tablet 1  "tablet     nicotine polacrilex (NICORETTE) gum 4-8 mg     omeprazole (priLOSEC) CR capsule 20 mg     ondansetron (ZOFRAN-ODT) ODT tab 4 mg     prazosin (MINIPRESS) capsule 1 mg     vitamin B1 (THIAMINE) tablet 100 mg            Allergies:     Allergies   Allergen Reactions     Imitrex [Sumatriptan] Other (See Comments)     Pain in jaw and shoulder. Flushing.     Topamax [Topiramate] GI Disturbance     Trazodone Other (See Comments)     Headache, dizziness.             Physical Exam:   /68   Pulse 62   Temp 97.7  F (36.5  C) (Temporal)   Resp 16   Ht 1.626 m (5' 4\")   Wt 97.5 kg (215 lb)   LMP 05/01/2020   SpO2 97%   Breastfeeding No   BMI 36.90 kg/m     GENERAL: Alert and oriented x 3. NAD.   HEENT: Anicteric sclera. Mucous membranes moist.   CV: RRR. S1, S2. No murmurs appreciated.   RESPIRATORY: Effort normal on room air. Lungs CTAB with no wheezing, rales, rhonchi.   GI: Abdomen soft and non distended with normoactive bowel sounds present in all quadrants. No tenderness, rebound, guarding.   MUSCULOSKELETAL: No joint swelling or tenderness. Moves all extremities.   NEUROLOGICAL: No focal deficits. Strength 5/5 bilaterally in upper and lower extremities.   EXTREMITIES: No peripheral edema. Intact bilateral pedal pulses.   SKIN: No jaundice. No rashes.          Labs:   CBC:  Recent Labs   Lab Test 06/10/20  1109   WBC 5.3   RBC 4.58   HGB 12.9   HCT 39.3   MCV 86   MCH 28.2   MCHC 32.8   RDW 13.8          CMP:  Recent Labs   Lab Test 06/10/20  1109   *   POTASSIUM 3.7   CHLORIDE 113*   LASHELL 8.0*   CO2 25   BUN 8   CR 0.64   GLC 93   AST 30   ALT 20   BILITOTAL 0.3   ALBUMIN 3.4   PROTTOTAL 7.3   ALKPHOS 68               Vidhi Tellez PA-C  Internal Medicine [facility] Hospitalist   [facility]   Pager: [phone]           "

## 2020-06-11 NOTE — PLAN OF CARE
Behavioral Team Discussion: (6/11/2020)    Continued Stay Criteria/Rationale: Patient admitted for alcohol withdrawal and alcohol Use Disorder.  Plan: The following services will be provided to the patient; psychiatric assessment, medication management, therapeutic milieu, individual and group support, and skills groups.   Participants: 3A Provider: Dr. Perfecto James MD; 3A RN's: Peace Lo, RN; 3A CM's: Jaimie Kraus .  Summary/Recommendation: Providers will assess today for treatment recommendations, discharge planning, and aftercare plans. CM will meet with pt for discharge planning.   Medical/Physical: gastric bypass (2012)  Precautions:   Behavioral Orders   Procedures     Code 1 - Restrict to Unit     Routine Programming     As clinically indicated     Status 15     Every 15 minutes.     Withdrawal precautions     Rationale for change in precautions or plan: N/A  Progress: No Change.

## 2020-06-11 NOTE — H&P
[id number]  Telemedicine Visit: The patient's condition can be safely assessed and treated via synchronous audio and visual telemedicine encounter.   Start Time: 10.22  Stop Time: 10.39  Reason for Telemedicine Visit: Covid-19   Originating Site (Patient Location): Station 3[facility]  Distant Site (Provider Location): Provider Remote Setting   Consent: The patient/guardian has verbally consented to: the potential risks and benefits of telemedicine (video visit) versus in person care; bill my insurance or make self-payment for services provided; and responsibility for payment of non-covered services.   Mode of Communication: Video Conference via polycom  As the provider I attest to compliance with applicable laws and regulations related to telemedicine.       The patient/guardian has been notified of the following:   This telemedicine visit is conducted live between you and your clinician. We have found that certain health care needs can be provided without the need for a physical exam. This service lets us provide the care you need with a telemedicine conversation.

## 2020-06-11 NOTE — PROGRESS NOTES
[place]  Results:    Date: 6/11/2020   Download PDF   Viviana Massey     Risk Indicators   NARX SCORES   Narcotic   000   Sedative   000   Stimulant   000   Explanation and Guidance   OVERDOSE RISK SCORE   000   (Range 000-999)   Explanation and Guidance   ADDITIONAL RISK INDICATORS ( 0 )   Explanation and Guidance   This NarxCare report is based on search criteria supplied and the data entered by the dispensing pharmacy. For more information about any prescription, please contact the dispensing pharmacy or the prescriber. NarxCare scores and reports are intended to aid, not replace, medical decision making. None of the information presented should be used as sole justification for providing or refusing to provide medications. The information on this report is not warranted as accurate or complete.   Graphs   RX GRAPH   Narcotic Buprenorphine Sedative Stimulant Other   All PrescribersPrescribers2 - Sven Burton, M1 - Giselalvarez [id number]                       Buprenorphine mg   [id number]   Morphine MgEq (MME)   [id number]   Lorazepam MgEq (LME)   [id number]   *Per CDC guidance, the MME conversion factors prescribed or provided as part of the medication-assisted treatment for opioid use disorder should not be used to benchmark against dosage thresholds meant for opioids prescribed for pain. Buprenorphine products have no agreed upon morphine equivalency, and as partial opioid agonists, are not expected to be associated with overdose risk in the same dose-dependent manner as doses for full agonist opioids. MME = morphine milligram equivalents. LME = Lorazepam milligram equivalents. mg = dose in milligrams.   Summary   Summary  Total Prescriptions: 2   Total Prescribers: 2   Total Pharmacies: 2   Narcotics* (excluding Buprenorphine)  Current Qty: 0   Current MME/day: 0.00   30 Day Avg MME/day: 0.00   Sedatives*  Current Qty: 0   Current LME/day:  0.00   30 Day Avg LME/day: 0.00   Buprenorphine*  Current Qty: 0   Current mg/day: 0.00   30 Day Avg mg/day: 0.00   Rx Data   PRESCRIPTIONS   Total Prescriptions: 2   Total Private Pay: 0     Fill Date ID Written Drug Qty Days Prescriber Rx # Pharmacy Refill Daily Dose * Pymt Type    04/13/2020  1  03/27/2020  Gabapentin 600 Mg Tablet    90.00 30 [address]  [id number]  [place] ([id number])  10/12  [place]  [place]   03/18/2020  2  03/18/2020  Gabapentin 600 Mg Tablet    90.00 30 Er Zuniga  [id number]  [place] ([id number][place]  0/0  [place]   *Per CDC guidance, the MME conversion factors prescribed or provided as part of the medication-assisted treatment for opioid use disorder should not be used to benchmark against dosage thresholds meant for opioids prescribed for pain. Buprenorphine products have no agreed upon morphine equivalency, and as partial opioid agonists, are not expected to be associated with overdose risk in the same dose-dependent manner as doses for full agonist opioids. MME = morphine milligram equivalents. LME = Lorazepam milligram equivalents. mg = dose in milligrams.   Providers  Total Providers: 2   Name  Address  City  State  Zipcode  Phone    Gisel So [address] [place] [address] [phone]   Sven Burton MD [address] [place] [id number] [phone]   Pharmacies  Total Pharmacies: 2   Name  Address  City  State  Zipcode  Phone    [facility] [phone]) [address]  [facility] [id number] [phone]   [facility] [place] [phone] [facility] [place] [address] [phone]       The report provided is based upon the search criteria entered and the corresponding data as it has been reported by dispenser(s). If erroneous information is identified or additional information is needed, please contact the dispenser or the prescriber provided on the report. Note, federal regulation (CFR Title 42: Part 2) prevents federally funded opioid treatment programs (OTPs) from releasing  certain patient data. As such, controlled substances dispensed from OTPs for medication-assisted treatment will not appear in the [place]  report. Morphine milligram equivalent (MME) conversion factors published by the CDC are used in the MME calculation. Per the CDC, the MME conversion factor is intended only for analytic purposes where prescription data are used to retrospectively calculate daily MME to inform analyses of risks associated with opioid prescribing. This value does not constitute clinical guidance or recommendations for converting patients from one form of opioid analgesic to another. Per the CDC, the conversion factors for drugs prescribed or provided, as part of medication-assisted treatment for opioid use disorder should not be used to benchmark against MME dosage thresholds meant for opioids prescribed for pain. Buprenorphine products listed in the CDC s MME file do not have an associated conversion factor. Lastly, the CDC notes, in clinical practice, calculating MME for methadone often involves a sliding-scale approach, whereby the conversion factor increases with increasing dose. The conversion factor of 3 for methadone presented in this file could underestimate MME for a given patient. This report contains confidential information, including patient identifiers, and is not a public record. The information on this report must be treated as protected health information and is only to be disclosed to others as authorized by applicable state and Federal regulations.   Powered By     [place] Prescription Monitoring Program  [place] Board of Pharmacy  [facility] [facility] [address]  [place] [address]  3 [phone]     [employer] 2020. All Rights Reserved.

## 2020-06-11 NOTE — PLAN OF CARE
"Patient was monitored for alcohol detox on [facility]A and scored 5 and 4 during shift. Has not received Valium since 20:25 yesterday (6/10/2020)/> 24 hours. As of 20:40 patient is now Out of Detox.    Patient was isolative to room for most of shift, lying in bed. PRN Hydroxyzine 50 mg once for anxiety at 17:00. Expressed frustration with the dosing schedule when asked for medication again at 21:00 and was told it was too soon. Advised to speak to MD about changing dose and/or frequency tomorrow. States her sleep is \"off\" and she feels \"tired.\" Appetite is \"good\" ate 90% of dinner. Medication compliant. Denies SI, SIB, HI and psychotic symptoms.   "

## 2020-06-11 NOTE — PROGRESS NOTES
Met with Pt to initiate discharge planning.  Pt reports she is set up for [facility] and they are holding her bed.  Pt will sign JAMEEL for [facility] for coordination of care for transfer when ready to discharge.
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
## 2020-06-12 VITALS
TEMPERATURE: 95.6 F | SYSTOLIC BLOOD PRESSURE: 132 MMHG | RESPIRATION RATE: 16 BRPM | DIASTOLIC BLOOD PRESSURE: 85 MMHG | WEIGHT: 215 LBS | BODY MASS INDEX: 36.7 KG/M2 | HEIGHT: 64 IN | HEART RATE: 80 BPM | OXYGEN SATURATION: 96 %

## 2020-06-12 PROCEDURE — 99239 HOSP IP/OBS DSCHRG MGMT >30: CPT | Mod: 95 | Performed by: PSYCHIATRY & NEUROLOGY

## 2020-06-12 PROCEDURE — 25000132 ZZH RX MED GY IP 250 OP 250 PS 637: Performed by: PSYCHIATRY & NEUROLOGY

## 2020-06-12 RX ORDER — PRAZOSIN HYDROCHLORIDE 1 MG/1
1 CAPSULE ORAL AT BEDTIME
Qty: 30 CAPSULE | Refills: 0 | Status: SHIPPED | OUTPATIENT
Start: 2020-06-12

## 2020-06-12 RX ORDER — HYDROXYZINE HYDROCHLORIDE 50 MG/1
50 TABLET, FILM COATED ORAL 3 TIMES DAILY PRN
Qty: 90 TABLET | Refills: 0 | Status: SHIPPED | OUTPATIENT
Start: 2020-06-12

## 2020-06-12 RX ORDER — MULTIPLE VITAMINS W/ MINERALS TAB 9MG-400MCG
1 TAB ORAL DAILY
Qty: 30 TABLET | Refills: 0 | Status: SHIPPED | OUTPATIENT
Start: 2020-06-13

## 2020-06-12 RX ORDER — GABAPENTIN 600 MG/1
600 TABLET ORAL 3 TIMES DAILY
Qty: 90 TABLET | Refills: 0 | Status: SHIPPED | OUTPATIENT
Start: 2020-06-12

## 2020-06-12 RX ORDER — BUPROPION HYDROCHLORIDE 300 MG/1
300 TABLET ORAL EVERY MORNING
Qty: 30 TABLET | Refills: 0 | Status: SHIPPED | OUTPATIENT
Start: 2020-06-12

## 2020-06-12 RX ORDER — DISULFIRAM 250 MG/1
250 TABLET ORAL DAILY
Qty: 30 TABLET | Refills: 0 | Status: SHIPPED | OUTPATIENT
Start: 2020-06-12

## 2020-06-12 RX ORDER — LANOLIN ALCOHOL/MO/W.PET/CERES
100 CREAM (GRAM) TOPICAL DAILY
Qty: 30 TABLET | Refills: 0 | Status: SHIPPED | OUTPATIENT
Start: 2020-06-13

## 2020-06-12 RX ADMIN — THIAMINE HCL TAB 100 MG 100 MG: 100 TAB at 08:46

## 2020-06-12 RX ADMIN — NICOTINE POLACRILEX 8 MG: 4 GUM, CHEWING ORAL at 12:10

## 2020-06-12 RX ADMIN — OMEPRAZOLE 20 MG: 20 CAPSULE, DELAYED RELEASE ORAL at 08:46

## 2020-06-12 RX ADMIN — FOLIC ACID 1 MG: 1 TABLET ORAL at 08:46

## 2020-06-12 RX ADMIN — ACETAMINOPHEN 650 MG: 325 TABLET, FILM COATED ORAL at 12:09

## 2020-06-12 RX ADMIN — MULTIPLE VITAMINS W/ MINERALS TAB 1 TABLET: TAB at 08:46

## 2020-06-12 RX ADMIN — GABAPENTIN 600 MG: 600 TABLET, FILM COATED ORAL at 08:46

## 2020-06-12 RX ADMIN — NICOTINE POLACRILEX 8 MG: 4 GUM, CHEWING ORAL at 08:49

## 2020-06-12 ASSESSMENT — ACTIVITIES OF DAILY LIVING (ADL)
HYGIENE/GROOMING: INDEPENDENT
DRESS: INDEPENDENT
ORAL_HYGIENE: INDEPENDENT

## 2020-06-12 NOTE — DISCHARGE INSTRUCTIONS
Behavioral Discharge Planning and Instructions    Summary: You were admitted to [facility] on 6/9/20 for detoxification from alcohol.A medical exam was performed that included lab work. Case management assessment with recommendation for continuing care. You are being discharged to [facility].     Main Diagnosis: Alcohol use disorder, severe   Alcohol withdrawal, severe  Major depressive disorder    Major Treatments, Procedures and Findings: Detoxification and stabilization. Chemical dependency assessment and referral    Symptoms to Report:  If  you experience feeling more anxiety, confusion, losing more sleep, mood declining or thoughts of suicide, notify your treatment team or notify your primary physician. IF ANY OF THE SYMPTOMS YOU ARE EXPERIENCING ARE A MEDICAL EMERGENCY CALL 911 IMMEDIATELY.     Lifestyle Adjustment: Adjust your lifestyle to get enough sleep, relaxation, exercise and  good nutrition. Continue to develop healthy coping skills to  decrease stress and promote a sober living environment. No use of alcohol, illegal drugs or addictive medications other than what is currently prescribed. AA, NA, and  Sponsor are excellent resources for support.    Primary Care Follow-up:  Patient will follow-up with her primary care physician after she completes treatment.    Treatment Follow-Up:  [facility]  [address] [place] [address]  [phone]    Resources:   Crisis Intervention: 222.639.3835 or 089-531-5008 (TTY: 623.325.4601).  Call anytime for help.  National [place] on Mental Illness (www.[place].liliya.org): 632.996.8565 or 191-779-0406.  Alcoholics Anonymous (www.alcoholics-anonymous.org): Check your phone book for your local chapter.  Suicide Awareness Voices of Education (SAVE) (www.save.org): 302-331-LQIN (2883)  National Suicide Prevention Line (www.mentalhealthmn.org): 399-442-BCRX (2647)  Mental Health Consumer/Survivor Network of [place] (www.mhcsn.net): 687.765.3571 or 470-252-9366  Mental Health  Association of [place] (www.mentalhealth.org): 902.508.5713 or 749-626-9308     General Medication Instructions:   See your medication sheet(s) for instructions.   Take all medicines as directed.  Make no changes unless your doctor suggests them.   Go to all your doctor visits.  Be sure to have all your required lab tests. This way, your medicines can be refilled on time.  Do not use any drugs not prescribed by your doctor.  Avoid alcohol.    Please Note:  If you have any questions at anytime after you are discharged please call the [facility] [place] detox unit 3A at [phone].  Please take this discharge folder with you to all your follow up appointments, it contains your lab results, diagnosis, medication list and discharge recommendations.

## 2020-06-12 NOTE — DISCHARGE SUMMARY
Admit Date:     06/10/2020          The patient was seen by telemedicine from 10:00 to 10:20.      Please review the detailed admission note by Dr. James on 06/11/2020.      DIAGNOSES:   Axis I:   1.  Alcohol use disorder, severe.   2.  Methamphetamine use disorder, severe.   3.  Major depressive disorder, moderate, recurrent, without psychotic features.   4.  Posttraumatic stress disorder.     5.  Generalized anxiety disorder.      HISTORY OF PRESENT ILLNESS:  The patient completed detox from alcohol.  She was seen by Vidhi Tellez on 06/11.  She is status post gastric bypass, and a B12 shot was given to her.  The patient had a consult placed, but has not been seen yet by Nutritional consult.  She is out of detox.  During the hospitalization, the patient completed detoxification.  Her energy, motivation, sleep and interest improved.  She did not have any suicidal or homicidal ideation, plan or intent.  She came from [facility], and her plan is to go back to [facility].      DISCHARGE DISPOSITION:  The patient going back to [facility].      DISCHARGE MENTAL STATUS EXAMINATION:  The patient is alert, oriented x 3.  Recent and remote memory, language, and fund of knowledge are all adequate.  No loose associations.  The patient does not have any suicidal ideation, plan or intent.        DISCHARGE MEDICATIONS:  She will continue bupropion 300, Antabuse, gabapentin 600 mg 3 times a day, hydroxyzine, multivitamin, omeprazole, prazosin 1 mg.                    Labs:   No results found for this or any previous visit (from the past 48 hour(s)).  Because this patient meets criteria for an Alcohol Use Disorder, I performed the following brief intervention on the date of this note:              1) Expressed concern that the patient is drinking at unhealthy levels known to increase their risk of alcohol related problems              2) Gave feedback linking alcohol use and health, including personalized feedback explaining how alcohol use  can interact with their medical and/or psychiatric problems, and with prescribed medications.              3) Advised patient to abstain.      DISCHARGE MENTAL STATUS EXAMINATION:  The patient is alert, oriented x3.  Good fund of knowledge.  Good use of language.  Recent and remote memory, language, fund of knowledge are all adequate.  Euthymic mood congruent affect  Speech normal rate/rhythm linear tp no loose asso,The patient does not have any active suicidal or homicidal ideation.  Does not have any auditory or visual hallucination.  Fair insight/judgment At this time, the patient was stable to be discharged.        Pt was not determined to not be a danger to himself or others. At the current time of discharge, the patient does not meet criteria for involuntary hospitalization. On the day of discharge, the patient reports that they do not have suicidal or homicidal ideation and would never hurt themselves or others. Steps taken to minimize risk include: assessing patient s behavior and thought process daily during hospital stay, discharging patient with adequate plan for follow up for mental and physical health and discussing safety plan of returning to the hospital should the patient ever have thoughts of harming themselves or others. Therefore, based on all available evidence including the factors cited above, the patient does not appear to be at imminent risk for self-harm, and is appropriate for outpatient level of care.     Educated about side effects/risk vs benefits /alternative including non treatment.Pt consented to be on medication.     .Total time spent on discharge summary more than 35 min  More than  20 min  planning, coordination of care, medication reconciliation and performance of physical exam on day of discharge.Care was coordinated with unit RN and unit therapist    .   Viviana Massey   Home Medication Instructions LINDEN:[id number]    Printed on:06/15/20 6816   Medication Information                       buPROPion (WELLBUTRIN XL) 300 MG 24 hr tablet  Take 1 tablet (300 mg) by mouth every morning             disulfiram (ANTABUSE) 250 MG tablet  Take 1 tablet (250 mg) by mouth daily             gabapentin (NEURONTIN) 600 MG tablet  Take 1 tablet (600 mg) by mouth 3 times daily             hydrOXYzine (ATARAX) 50 MG tablet  Take 1 tablet (50 mg) by mouth 3 times daily as needed for anxiety             multivitamin w/minerals (THERA-VIT-M) tablet  Take 1 tablet by mouth daily             omeprazole (PRILOSEC) 20 MG DR capsule  Take 1 capsule (20 mg) by mouth 2 times daily             prazosin (MINIPRESS) 1 MG capsule  Take 1 capsule (1 mg) by mouth At Bedtime             vitamin B1 (THIAMINE) 100 MG tablet  Take 1 tablet (100 mg) by mouth daily               AARON COREA MD             D: 2020   T: 2020   MT:       Name:     XIOMARA GEIGER   MRN:      [id number]        Account:        [id number]   :      1980           Admit Date:     06/10/2020                                  Discharge Date:       Document: [id number]       cc: Tracey Lizama NP

## 2020-06-12 NOTE — DISCHARGE SUMMARY
[id number]    Telemedicine Visit: The patient's condition can be safely assessed and treated via synchronous audio and visual telemedicine encounter.   Start Time: 10.00  Stop Time: 10.20  Reason for Telemedicine Visit: Covid-19   Originating Site (Patient Location): [facility]  Distant Site (Provider Location): Provider Remote Setting   Consent: The patient/guardian has verbally consented to: the potential risks and benefits of telemedicine (video visit) versus in person care; bill my insurance or make self-payment for services provided; and responsibility for payment of non-covered services.   Mode of Communication: Video Conference via polycom  As the provider I attest to compliance with applicable laws and regulations related to telemedicine.       The patient/guardian has been notified of the following:   This telemedicine visit is conducted live between you and your clinician. We have found that certain health care needs can be provided without the need for a physical exam. This service lets us provide the care you need with a telemedicine conversation.

## 2020-06-12 NOTE — PROGRESS NOTES
Patient discharged with personal belongings and discharge medications to [facility]. Discharge medication instructions and lab results reviewed. Patient verbalizes understanding. Denies thoughts of self harm.Patient escorted off the unit by Psyche Associate Roxann VAZQUEZ

## 2021-09-04 ENCOUNTER — TELEPHONE (OUTPATIENT)
Dept: BEHAVIORAL HEALTH | Facility: CLINIC | Age: 41
End: 2021-09-04

## 2021-09-04 ENCOUNTER — HOSPITAL ENCOUNTER (INPATIENT)
Facility: CLINIC | Age: 41
LOS: 1 days | Discharge: LEFT BEFORE DISCHARGE | End: 2021-09-05
Attending: EMERGENCY MEDICINE | Admitting: PSYCHIATRY & NEUROLOGY
Payer: COMMERCIAL

## 2021-09-04 DIAGNOSIS — F10.10 ALCOHOL ABUSE: ICD-10-CM

## 2021-09-04 DIAGNOSIS — Z20.822 COVID-19 RULED OUT BY LABORATORY TESTING: ICD-10-CM

## 2021-09-04 DIAGNOSIS — F10.129 ALCOHOL ABUSE WITH INTOXICATION (H): ICD-10-CM

## 2021-09-04 LAB — ALCOHOL BREATH TEST: 0.12 (ref 0–0.01)

## 2021-09-04 PROCEDURE — HZ2ZZZZ DETOXIFICATION SERVICES FOR SUBSTANCE ABUSE TREATMENT: ICD-10-PCS | Performed by: PSYCHIATRY & NEUROLOGY

## 2021-09-04 PROCEDURE — 99285 EMERGENCY DEPT VISIT HI MDM: CPT | Mod: 25 | Performed by: EMERGENCY MEDICINE

## 2021-09-04 PROCEDURE — 82075 ASSAY OF BREATH ETHANOL: CPT | Performed by: EMERGENCY MEDICINE

## 2021-09-04 PROCEDURE — 99284 EMERGENCY DEPT VISIT MOD MDM: CPT | Performed by: EMERGENCY MEDICINE

## 2021-09-05 VITALS
SYSTOLIC BLOOD PRESSURE: 132 MMHG | WEIGHT: 220 LBS | HEART RATE: 72 BPM | RESPIRATION RATE: 16 BRPM | HEIGHT: 64 IN | OXYGEN SATURATION: 98 % | BODY MASS INDEX: 37.56 KG/M2 | DIASTOLIC BLOOD PRESSURE: 89 MMHG | TEMPERATURE: 98 F

## 2021-09-05 PROBLEM — F10.10 ALCOHOL ABUSE: Status: ACTIVE | Noted: 2021-09-05

## 2021-09-05 LAB
ALBUMIN SERPL-MCNC: 3.5 G/DL (ref 3.4–5)
ALBUMIN UR-MCNC: NEGATIVE MG/DL
ALP SERPL-CCNC: 64 U/L (ref 40–150)
ALT SERPL W P-5'-P-CCNC: 48 U/L (ref 0–50)
AMPHETAMINES UR QL SCN: NORMAL
ANION GAP SERPL CALCULATED.3IONS-SCNC: 5 MMOL/L (ref 3–14)
APPEARANCE UR: CLEAR
AST SERPL W P-5'-P-CCNC: 115 U/L (ref 0–45)
BARBITURATES UR QL: NORMAL
BASOPHILS # BLD AUTO: 0 10E3/UL (ref 0–0.2)
BASOPHILS NFR BLD AUTO: 1 %
BENZODIAZ UR QL: NORMAL
BILIRUB SERPL-MCNC: 0.2 MG/DL (ref 0.2–1.3)
BILIRUB UR QL STRIP: NEGATIVE
BUN SERPL-MCNC: 13 MG/DL (ref 7–30)
CALCIUM SERPL-MCNC: 7.7 MG/DL (ref 8.5–10.1)
CANNABINOIDS UR QL SCN: NORMAL
CHLORIDE BLD-SCNC: 113 MMOL/L (ref 94–109)
CO2 SERPL-SCNC: 23 MMOL/L (ref 20–32)
COCAINE UR QL: NORMAL
COLOR UR AUTO: ABNORMAL
CREAT SERPL-MCNC: 0.67 MG/DL (ref 0.52–1.04)
EOSINOPHIL # BLD AUTO: 0.2 10E3/UL (ref 0–0.7)
EOSINOPHIL NFR BLD AUTO: 3 %
ERYTHROCYTE [DISTWIDTH] IN BLOOD BY AUTOMATED COUNT: 12.7 % (ref 10–15)
GFR SERPL CREATININE-BSD FRML MDRD: >90 ML/MIN/1.73M2
GGT SERPL-CCNC: 31 U/L (ref 0–40)
GLUCOSE BLD-MCNC: 93 MG/DL (ref 70–99)
GLUCOSE UR STRIP-MCNC: NEGATIVE MG/DL
HCG UR QL: NEGATIVE
HCT VFR BLD AUTO: 37.9 % (ref 35–47)
HGB BLD-MCNC: 12.8 G/DL (ref 11.7–15.7)
HGB UR QL STRIP: NEGATIVE
IMM GRANULOCYTES # BLD: 0 10E3/UL
IMM GRANULOCYTES NFR BLD: 0 %
KETONES UR STRIP-MCNC: NEGATIVE MG/DL
LEUKOCYTE ESTERASE UR QL STRIP: NEGATIVE
LYMPHOCYTES # BLD AUTO: 2.1 10E3/UL (ref 0.8–5.3)
LYMPHOCYTES NFR BLD AUTO: 31 %
MCH RBC QN AUTO: 29.6 PG (ref 26.5–33)
MCHC RBC AUTO-ENTMCNC: 33.8 G/DL (ref 31.5–36.5)
MCV RBC AUTO: 88 FL (ref 78–100)
MONOCYTES # BLD AUTO: 0.5 10E3/UL (ref 0–1.3)
MONOCYTES NFR BLD AUTO: 7 %
MUCOUS THREADS #/AREA URNS LPF: PRESENT /LPF
NEUTROPHILS # BLD AUTO: 3.9 10E3/UL (ref 1.6–8.3)
NEUTROPHILS NFR BLD AUTO: 58 %
NITRATE UR QL: NEGATIVE
NRBC # BLD AUTO: 0 10E3/UL
NRBC BLD AUTO-RTO: 0 /100
OPIATES UR QL SCN: NORMAL
PH UR STRIP: 5 [PH] (ref 5–7)
PLATELET # BLD AUTO: 312 10E3/UL (ref 150–450)
POTASSIUM BLD-SCNC: 3.5 MMOL/L (ref 3.4–5.3)
PROT SERPL-MCNC: 6.9 G/DL (ref 6.8–8.8)
RBC # BLD AUTO: 4.32 10E6/UL (ref 3.8–5.2)
RBC URINE: <1 /HPF
SARS-COV-2 RNA RESP QL NAA+PROBE: NEGATIVE
SODIUM SERPL-SCNC: 141 MMOL/L (ref 133–144)
SP GR UR STRIP: 1.02 (ref 1–1.03)
SQUAMOUS EPITHELIAL: <1 /HPF
TSH SERPL DL<=0.005 MIU/L-ACNC: 2.4 MU/L (ref 0.4–4)
UROBILINOGEN UR STRIP-MCNC: NORMAL MG/DL
WBC # BLD AUTO: 6.7 10E3/UL (ref 4–11)
WBC URINE: 1 /HPF

## 2021-09-05 PROCEDURE — C9803 HOPD COVID-19 SPEC COLLECT: HCPCS | Performed by: EMERGENCY MEDICINE

## 2021-09-05 PROCEDURE — 85025 COMPLETE CBC W/AUTO DIFF WBC: CPT | Performed by: EMERGENCY MEDICINE

## 2021-09-05 PROCEDURE — 84443 ASSAY THYROID STIM HORMONE: CPT | Performed by: NURSE PRACTITIONER

## 2021-09-05 PROCEDURE — 99236 HOSP IP/OBS SAME DATE HI 85: CPT | Mod: AI | Performed by: PSYCHIATRY & NEUROLOGY

## 2021-09-05 PROCEDURE — 36415 COLL VENOUS BLD VENIPUNCTURE: CPT | Performed by: EMERGENCY MEDICINE

## 2021-09-05 PROCEDURE — 81001 URINALYSIS AUTO W/SCOPE: CPT | Performed by: NURSE PRACTITIONER

## 2021-09-05 PROCEDURE — U0005 INFEC AGEN DETEC AMPLI PROBE: HCPCS | Performed by: EMERGENCY MEDICINE

## 2021-09-05 PROCEDURE — 128N000004 HC R&B CD ADULT

## 2021-09-05 PROCEDURE — 99207 PR CDG-CODE CATEGORY CHANGED: CPT | Performed by: PSYCHIATRY & NEUROLOGY

## 2021-09-05 PROCEDURE — 82977 ASSAY OF GGT: CPT | Performed by: NURSE PRACTITIONER

## 2021-09-05 PROCEDURE — 250N000013 HC RX MED GY IP 250 OP 250 PS 637: Performed by: NURSE PRACTITIONER

## 2021-09-05 PROCEDURE — 80053 COMPREHEN METABOLIC PANEL: CPT | Performed by: EMERGENCY MEDICINE

## 2021-09-05 PROCEDURE — 80307 DRUG TEST PRSMV CHEM ANLYZR: CPT | Performed by: EMERGENCY MEDICINE

## 2021-09-05 PROCEDURE — 81025 URINE PREGNANCY TEST: CPT | Performed by: EMERGENCY MEDICINE

## 2021-09-05 RX ORDER — LANOLIN ALCOHOL/MO/W.PET/CERES
3 CREAM (GRAM) TOPICAL
Status: DISCONTINUED | OUTPATIENT
Start: 2021-09-05 | End: 2021-09-05 | Stop reason: HOSPADM

## 2021-09-05 RX ORDER — FOLIC ACID 1 MG/1
1 TABLET ORAL DAILY
Status: DISCONTINUED | OUTPATIENT
Start: 2021-09-05 | End: 2021-09-05 | Stop reason: HOSPADM

## 2021-09-05 RX ORDER — PRAZOSIN HYDROCHLORIDE 1 MG/1
1 CAPSULE ORAL AT BEDTIME
Status: DISCONTINUED | OUTPATIENT
Start: 2021-09-05 | End: 2021-09-05

## 2021-09-05 RX ORDER — MULTIPLE VITAMINS W/ MINERALS TAB 9MG-400MCG
1 TAB ORAL DAILY
Status: DISCONTINUED | OUTPATIENT
Start: 2021-09-05 | End: 2021-09-05

## 2021-09-05 RX ORDER — ACETAMINOPHEN 325 MG/1
650 TABLET ORAL EVERY 4 HOURS PRN
Status: DISCONTINUED | OUTPATIENT
Start: 2021-09-05 | End: 2021-09-05 | Stop reason: HOSPADM

## 2021-09-05 RX ORDER — DIAZEPAM 5 MG
5-20 TABLET ORAL EVERY 30 MIN PRN
Status: DISCONTINUED | OUTPATIENT
Start: 2021-09-05 | End: 2021-09-05 | Stop reason: HOSPADM

## 2021-09-05 RX ORDER — MAGNESIUM HYDROXIDE/ALUMINUM HYDROXICE/SIMETHICONE 120; 1200; 1200 MG/30ML; MG/30ML; MG/30ML
30 SUSPENSION ORAL EVERY 4 HOURS PRN
Status: DISCONTINUED | OUTPATIENT
Start: 2021-09-05 | End: 2021-09-05 | Stop reason: HOSPADM

## 2021-09-05 RX ORDER — POLYETHYLENE GLYCOL 3350 17 G/17G
17 POWDER, FOR SOLUTION ORAL DAILY PRN
Status: DISCONTINUED | OUTPATIENT
Start: 2021-09-05 | End: 2021-09-05 | Stop reason: HOSPADM

## 2021-09-05 RX ORDER — LANOLIN ALCOHOL/MO/W.PET/CERES
100 CREAM (GRAM) TOPICAL DAILY
Status: DISCONTINUED | OUTPATIENT
Start: 2021-09-05 | End: 2021-09-05

## 2021-09-05 RX ORDER — MULTIPLE VITAMINS W/ MINERALS TAB 9MG-400MCG
1 TAB ORAL DAILY
Status: DISCONTINUED | OUTPATIENT
Start: 2021-09-05 | End: 2021-09-05 | Stop reason: HOSPADM

## 2021-09-05 RX ORDER — LANOLIN ALCOHOL/MO/W.PET/CERES
100 CREAM (GRAM) TOPICAL DAILY
Status: DISCONTINUED | OUTPATIENT
Start: 2021-09-05 | End: 2021-09-05 | Stop reason: HOSPADM

## 2021-09-05 RX ORDER — GABAPENTIN 600 MG/1
600 TABLET ORAL 3 TIMES DAILY
Status: DISCONTINUED | OUTPATIENT
Start: 2021-09-05 | End: 2021-09-05 | Stop reason: HOSPADM

## 2021-09-05 RX ORDER — HYDROXYZINE HYDROCHLORIDE 50 MG/1
50 TABLET, FILM COATED ORAL 3 TIMES DAILY PRN
Status: DISCONTINUED | OUTPATIENT
Start: 2021-09-05 | End: 2021-09-05 | Stop reason: HOSPADM

## 2021-09-05 RX ADMIN — THIAMINE HCL TAB 100 MG 100 MG: 100 TAB at 09:21

## 2021-09-05 RX ADMIN — FOLIC ACID 1 MG: 1 TABLET ORAL at 09:21

## 2021-09-05 RX ADMIN — OMEPRAZOLE 20 MG: 20 CAPSULE, DELAYED RELEASE ORAL at 09:21

## 2021-09-05 RX ADMIN — GABAPENTIN 600 MG: 600 TABLET, FILM COATED ORAL at 09:21

## 2021-09-05 RX ADMIN — MULTIPLE VITAMINS W/ MINERALS TAB 1 TABLET: TAB at 09:21

## 2021-09-05 ASSESSMENT — ACTIVITIES OF DAILY LIVING (ADL)
DRESS: INDEPENDENT
DRESS: INDEPENDENT
HYGIENE/GROOMING: INDEPENDENT
HYGIENE/GROOMING: INDEPENDENT
ORAL_HYGIENE: INDEPENDENT
LAUNDRY: WITH SUPERVISION
LAUNDRY: WITH SUPERVISION
ORAL_HYGIENE: INDEPENDENT

## 2021-09-05 ASSESSMENT — MIFFLIN-ST. JEOR: SCORE: 1637.91

## 2021-09-05 NOTE — PROGRESS NOTES
S:Pt admitted vie ED for detox from alcohol.  B; Pt has been drinking 1 pint of vodka daily and some cooking extract, last use 9/4/21@2000. Pt had a singular use of Xanax yesterday, but denies any regular or periodic use. (tox screen iss negative for benzodiazepines) Pt denies any seizure history but has had hallucinosis in withdrawal in the past. Pt has a history of a gastric bypass.Pt denies any other chronic or any acute illness or injury. Pt has a history of PTSD with 1 past suicide attempt by overdose in 2020 while grieving her mothers death. Pt denies any recent or current suicidal or homicidal ideation.  A:Pt does not appear in obvious withdrawal on admission.  R: Monitor and treat per MSSA with valium protocol. Withdrawal precautions and 15 min checks for safety.

## 2021-09-05 NOTE — ED NOTES
Patient updated on plan of care, patient educated on expectations on the detox unit, agreeable to expectations.

## 2021-09-05 NOTE — ED PROVIDER NOTES
[facility] EMERGENCY DEPARTMENT ([facility])    9/04/21     [address]     History     Chief Complaint   Patient presents with     Drug / Alcohol Assessment     HPI  Viviana Massey is a 41 year old female with history of ADHD, anxiety, gastric bypass and substance use disorder who presents seeking alcohol detox.  She has been drinking vodka as well as extracts. She also used Xanax today. Denies habitual use of Xanax or other substances.  No suicidal or homicidal ideation.  No history of alcohol withdrawal seizures, but does report prior history of DTs.     No other symptoms noted.      PAST MEDICAL HISTORY:   Past Medical History:   Diagnosis Date     Anxiety      Arthritis      Depressive disorder      Insomnia      Substance abuse (H)        PAST SURGICAL HISTORY:   Past Surgical History:   Procedure Laterality Date     GASTRIC BYPASS  2012     GI SURGERY  2013    gastric bipass     GYN SURGERY      D and C.       Past medical history, past surgical history, medications, and allergies were reviewed with the patient. Additional pertinent items: None    FAMILY HISTORY:   Family History   Problem Relation Age of Onset     Esophageal Cancer Father      Alcoholism Father      Cancer Maternal Grandmother      Lymphoma Maternal Uncle      Cancer Paternal Grandfather      Arthritis Mother      Alcoholism Mother      Alcoholism Sister      Alcoholism Brother        SOCIAL HISTORY:   Social History     Tobacco Use     Smoking status: Current Every Day Smoker     Packs/day: 1.00     Smokeless tobacco: Never Used   Substance Use Topics     Alcohol use: Yes     Comment: 6 cans of beer daily     Social history was reviewed with the patient. Additional pertinent items: None    Patient's Medications   New Prescriptions    No medications on file   Previous Medications    BUPROPION (WELLBUTRIN XL) 300 MG 24 HR TABLET    Take 1 tablet (300 mg) by mouth every morning    DISULFIRAM (ANTABUSE) 250 MG TABLET    Take 1 tablet  (250 mg) by mouth daily    GABAPENTIN (NEURONTIN) 600 MG TABLET    Take 1 tablet (600 mg) by mouth 3 times daily    HYDROXYZINE (ATARAX) 50 MG TABLET    Take 1 tablet (50 mg) by mouth 3 times daily as needed for anxiety    MULTIVITAMIN W/MINERALS (THERA-VIT-M) TABLET    Take 1 tablet by mouth daily    OMEPRAZOLE (PRILOSEC) 20 MG DR CAPSULE    Take 1 capsule (20 mg) by mouth 2 times daily    PRAZOSIN (MINIPRESS) 1 MG CAPSULE    Take 1 capsule (1 mg) by mouth At Bedtime    VITAMIN B1 (THIAMINE) 100 MG TABLET    Take 1 tablet (100 mg) by mouth daily   Modified Medications    No medications on file   Discontinued Medications    No medications on file          Allergies   Allergen Reactions     Imitrex [Sumatriptan] Other (See Comments)     Pain in jaw and shoulder. Flushing.     Topamax [Topiramate] GI Disturbance     Trazodone Other (See Comments)     Headache, dizziness.          ROS: 10 point ROS neg other than the symptoms noted above in the HPI.    Physical Exam   BP: 125/82  Pulse: 82  Temp: 97.8  F (36.6  C)  Resp: 14  SpO2: 97 %      Physical Exam  Vitals and nursing note reviewed.   Constitutional:       General: She is not in acute distress.     Appearance: She is not diaphoretic.      Comments: Appears intoxicated, smells strongly of alcohol.   HENT:      Head: Atraumatic.      Mouth/Throat:      Pharynx: No oropharyngeal exudate.   Eyes:      General: No scleral icterus.     Pupils: Pupils are equal, round, and reactive to light.   Cardiovascular:      Heart sounds: Normal heart sounds.   Pulmonary:      Effort: No respiratory distress.      Breath sounds: Normal breath sounds.   Abdominal:      General: Bowel sounds are normal.      Palpations: Abdomen is soft.      Tenderness: There is no abdominal tenderness.   Musculoskeletal:         General: No tenderness.   Skin:     General: Skin is warm.      Findings: No rash.         ED Course        Procedures                      Labs Ordered and Resulted from  Time of ED Arrival Up to the Time of Departure from the ED   ALCOHOL BREATH TEST POCT - Abnormal; Notable for the following components:       Result Value    Alcohol Breath Test 0.118 (*)     All other components within normal limits            Assessments & Plan (with Medical Decision Making)   This is a 41-year-old female who presents for detox from alcohol.  Patient states she drinks approximately 1 pint of vodka per day.  Last intake was at 2000 today.  She is awake alert and cooperative.  She used Xanax today but denies any other drug use.  She has a history of DTs.  No suicidal or homicidal ideation.  Alcohol level is 0.118.  We will admit for detox.    I have reviewed the nursing notes.    I have reviewed the findings, diagnosis, plan and need for follow up with the patient.    New Prescriptions    No medications on file       Final diagnoses:   None       9/4/2021   [facility] EMERGENCY DEPARTMENT    Artis Gutierres DO  09/05/21 0327

## 2021-09-05 NOTE — TELEPHONE ENCOUNTER
S: 11:15p, Dr. Patricio called w/ clinical on a 40y/M present in the [facility] ER requesting detox.       B: The pt arrived at the [facility] ER requesting detox. The pt is requesting alcohol detox. The pt drinks up to a pint of vodka daily unknown duration; plus extracts (cooking).  Last known drink was just prior to arrival. BA was .118 at 9:42pm    Pt denies endorses additional substance use of Zanex; last known use today, unknown mg use.    The pt s does not currently have withdrawal symptoms.    The pt does have a concern/Hx for DT s, the pt does not have a Hx/concern for seizures.     The pt has no medical concerns.     The pt can ambulated independently. The pt has not yet been drinking and eating in the ER.      There are no MH concerns w/ admission.    The pt has been in detox before- June 2020.    No concern for aggression or HI.     Covid lab needs to be ordered.   Utox needs to be ordered.   Pregnancy Test needs to be ordered.     CBC labs needs to be ordered.    Comp labs needs to be ordered.     Recent vital signs as of 9/4/2021 at 9:40p: temp 97.8; Pulse 82; /82; Resp Rate 14, SpO2 97%.     A: Vol    R: The pt is currently on the [facility] ER awaiting bed placement.    11:20p The pt has been added to the work-list. Intake waiting on labs for bed placement and medical clearance.
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
## 2021-09-05 NOTE — PLAN OF CARE
"  Problem: Substance Withdrawal  Goal: Substance Withdrawal  Description: Signs and symptoms of listed problems will be absent or manageable.  Outcome: Completed     Problem: Adult Inpatient Plan of Care  Goal: Readiness for Transition of Care  Outcome: Completed   Pt being monitored for alcohol withdrawal. No medications for alcohol withdrawal given. Pt is now requesting to leave and signed a 72 hour intent to leave. Dr. James and nursing supervisor notified.   Pt states she spoke with her sober house and she can return there and plans to continue at [facility] outpatient program    Pt stated \"It was one night  of drinking on antabuse and then I took a klonopin to counter act the withdrawal.\"  Pt denies that she uses klonopin on a regular basis.       Pt reports she has \"little bit of anxiety. \"  Denies feeling depressed. Denies SI.     States she slept well last night.     Pt met with Dr. James. See order to discharge pt AMA.     Viviana signed the AMA form. Discussed risk of incomplete withdrawal monitoring, risk of seizure and possibly death.     Pt provided handout with resources, handbook of the streets and aa meeting guidebook.     Pt wanted staff to contact [facility] for a medi cab. Only found contact for Mon- Friday services. Nursing supervisor did not know additional contact for them.     Pt provided bus token.     Pt will be discharged AMA at this time.     "

## 2021-09-05 NOTE — ED TRIAGE NOTES
Wants to detox from alcohol, last drink 1830.  Drinks 2 pints a day of vodka.  No history of seizures.

## 2021-09-05 NOTE — DISCHARGE INSTRUCTIONS
Behavioral Discharge Planning and Instructions  THANK YOU FOR CHOOSING THE [address]    Summary: You were admitted to [address] [place] [address] on 9-4-2021 for treatment and evaluation of alcohol use. You are being discharged today and the treatment teams recommendations are:  You have requested to leave [facility] and have signed a 72 hour intent to leave.   Dr. James will be discharging you against medical advice.       Recommendation:     Main Diagnoses: Per Dr. 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 You were followed by Psychiatry and Medical. You met with Case Management to complete a chemical health assessment and for discharge planning. You were given a copy of your lab results which were reviewed with you. Please bring your lab results with you to your primary follow up appointment. Make your recovery a priority, Viviana.     Symptoms to Report: If  you experience feeling more anxiety, confusion, losing more sleep, mood declining or thoughts of suicide, notify your treatment team or notify your primary physician. IF ANY OF THE SYMPTOMS YOU ARE EXPERIENCING ARE A MEDICAL EMERGENCY CALL 911 IMMEDIATELY.    Lifestyle Adjustment: Health Action Plan:  1.Create a daily schedule  2. Eat Healthy  3. Plan Enjoyable Sober Activities  4. Use Problem Solving Skills and Deal with Issues as they Arise.   5. Be Physically Active  6. Take your medications as prescribed  7. Get enough restful sleep  8. Practice Relaxation  9. Spend time with Supportive People  10. No use of alcohol, illegal drugs or addictive medications other than what is currently prescribed.   11.AA, NA Sponsor are excellent resources for support  12. Explore how  you can utilize  spirituality in your recovery      Medical Provider Follow Up:  [facility] in [place], [place]  COVID-19 info: [facility]  Get online care: [facility]  Address: [address], [place] [address]  Hours:   Closed ? Opens 7AM Tue  Phone: [phone]  Appointments: [employer] [facility]  Mental health clinic in [place], [place]  Address: [address], [place] [address]  Hours:   Closed ? Opens 9AM Mon  Phone: [phone]      You are aware you should make a follow up appointment with your primary care doctor for medical and medication management      Support Group:  AA/NA and Sponsor contact/support    Resources:   Keeping hands clean is one of the most important steps we can take to avoid getting sick and spreading germs to others.  Please wash your hands frequently.                        Great Pod casts for nutrition and wellness  Listen on Apple Podcasts  [employer]ishing [employer] Nutrition   [employer]   Nutrition       Understand the connection between what you eat and how you feel. Hosted by licensed nutritionists and dietitians from [employer] we share practical, real-life solutions for healthier living through nutrition.     Recovery apps for your phone to locate current in person and zoom recovery meetings  [place] - meeting karol  AA  - meeting karol  Meeting guide - meeting karol  Quick NA meeting - meeting karol  Kai- has various apps    My Mental Health Crisis Plan allows individuals with serious mental illness to:  Clearly state treatment preferences, including treatments to use and those not to use; medications to use and those not to use; preferences for hospitals; and preferences for doctors and other mental health professionals.  Decide who can act on their behalf, by designating a trusted person (sometimes referred to as  healthcare agent,   proxy,  or  health  care power of  ) as a decision-maker on their behalf. Some states require appointment of a decision-maker to carry out the PAD instructions.  Identify whom to notify in the event of a mental health crisis.  Share the plan with others, including doctors, other members of the care team, and family and friends.             Crisis Intervention: 232.240.6054 or 243-931-3383 (TTY: 947.344.3950).  Call anytime for help.  Suicide Awareness Voices of Education (SAVE) (www.save.org): 372-560-NDVN (1008)  National Suicide Prevention Line (www.mentalhealthmn.org): 477-750-DQXG (5863)  National [address] on Mental Illness (www.[place].liliya.org): 364.645.4452 or 092-718-5390.  Alcoholics Anonymous (www.alcoholics-anonymous.org): Or local information can be found 24 hours a day at:   AA Intergroup service office in [place] (http://www.aastpaul.org/) 207.492.5538  AA Intergroup service office in [facility]: 760.194.5020. (http://www.a[place].org/)  Narcotics Anonymous (www.nam[place].org)1-765.776.9725   [facility] Behavioral Intake [phone]    [facility] ([facility])  [facility] connects people seeking recovery to resources that help foster and sustain long-term recovery.  Whether you are seeking resources for treatment, transportation, housing, job training, education, health care or other pathways to recovery, [facility] is a great place to start.  [phone]. Www.[facility].org    General Medication Instructions:   See your medication sheet(s) for instructions. Take all medicines as directed.  Make no changes unless your doctor directs them. Go to all your doctor visits. Be sure to have all your required lab tests. This way, your medicines can be refilled in timely fashion. Do not use any drugs not prescribed by your provider. AA/NA and Sponsors are excellent resources for support. Avoid alcohol.    Please Note: If you have any questions at anytime after you are discharged  please call the [facility] [place] detox unit 3AW unit at [phone].  [facility], Behavioral Intake [phone]  Please take this discharge folder with you to all your follow up appointments, it contains your lab results, diagnosis, medication list and discharge recommendations.      THANK YOU FOR CHOOSING THE [facility]

## 2021-09-05 NOTE — PROGRESS NOTES
09/05/21 0350   Patient Belongings   Patient Belongings other (see comments)   Patient Belongings Put in Hospital Secure Location (Security or Locker, etc.) other (see comments)   Belongings Search Yes   Clothing Search Yes   Second Staff Debi and Ruby   Comment See Notes     Large bin:  -Purse, w/loose papers, keys, lighters,   Small bin:  -Phone, wallet  No Security   A               Admission:  I am responsible for any personal items that are not sent to the safe or pharmacy.  [facility] is not responsible for loss, theft or damage of any property in my possession.    Signature:  _________________________________ Date: _______  Time: _____                                              Staff Signature:  ____________________________ Date: ________  Time: _____      2nd Staff person, if patient is unable/unwilling to sign:    Signature: ________________________________ Date: ________  Time: _____     Discharge:  [facility] has returned all of my personal belongings:    Signature: _________________________________ Date: ________  Time: _____                                          Staff Signature:  ____________________________ Date: ________  Time: _____

## 2021-09-05 NOTE — DISCHARGE SUMMARY
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 for additional details.          Hospital Course:   PATIENT was [facility]  under DR lovett, please review the detailed admit note on 9/5/21   The patient was placed under status 15 (15 minute checks) to ensure patient safety.   MSSA protocol was initiated due to the patient's history of alcohol abuse and concern for withdrawal symptoms.  CBC, BMP and utox obtained.    All outpatient medications were continued    PATIENTdid participate in groups and was visible in the milieu.     During the hospitalization patient wanted to leave she [id number] intent to leave.  She is not holdable she is not suicidal she is not homicidal  She is not out of detox she will be discharged AGAINST MEDICAL ADVICE her prognosis is guarded if she relapses    Discussed with patient medications for craving.  Spoke with patient about triggers coping skills relapse prevention.    CONSULTS DONE DURING PATIENTS HOSPITALIZATION.  Patient discharged AMA did not see medicine                       Labs:reviewed with patient       Recent Results (from the past 48 hour(s))   Alcohol breath test POCT    Collection Time: 09/04/21  9:42 PM   Result Value Ref Range    Alcohol Breath Test 0.118 (A) 0.00 - 0.01   Comprehensive metabolic panel    Collection Time: 09/05/21 12:23 AM   Result Value Ref Range    Sodium 141 133 - 144 mmol/L    Potassium 3.5 3.4 - 5.3 mmol/L    Chloride 113 (H) 94 - 109 mmol/L    Carbon Dioxide (CO2)  23 20 - 32 mmol/L    Anion Gap 5 3 - 14 mmol/L    Urea Nitrogen 13 7 - 30 mg/dL    Creatinine 0.67 0.52 - 1.04 mg/dL    Calcium 7.7 (L) 8.5 - 10.1 mg/dL    Glucose 93 70 - 99 mg/dL    Alkaline Phosphatase 64 40 - 150 U/L     (H) 0 - 45 U/L    ALT 48 0 - 50 U/L    Protein Total 6.9 6.8 - 8.8 g/dL    Albumin 3.5 3.4 - 5.0 g/dL    Bilirubin Total 0.2 0.2 - 1.3 mg/dL    GFR Estimate >90 >60 mL/min/1.73m2   CBC with platelets and differential    Collection Time: 09/05/21 12:23 AM   Result Value Ref Range    WBC Count 6.7 4.0 - 11.0 10e3/uL    RBC Count 4.32 3.80 - 5.20 10e6/uL    Hemoglobin 12.8 11.7 - 15.7 g/dL    Hematocrit 37.9 35.0 - 47.0 %    MCV 88 78 - 100 fL    MCH 29.6 26.5 - 33.0 pg    MCHC 33.8 31.5 - 36.5 g/dL    RDW 12.7 10.0 - 15.0 %    Platelet Count 312 150 - 450 10e3/uL    % Neutrophils 58 %    % Lymphocytes 31 %    % Monocytes 7 %    % Eosinophils 3 %    % Basophils 1 %    % Immature Granulocytes 0 %    NRBCs per 100 WBC 0 <1 /100    Absolute Neutrophils 3.9 1.6 - 8.3 10e3/uL    Absolute Lymphocytes 2.1 0.8 - 5.3 10e3/uL    Absolute Monocytes 0.5 0.0 - 1.3 10e3/uL    Absolute Eosinophils 0.2 0.0 - 0.7 10e3/uL    Absolute Basophils 0.0 0.0 - 0.2 10e3/uL    Absolute Immature Granulocytes 0.0 <=0.0 10e3/uL    Absolute NRBCs 0.0 10e3/uL   GGT    Collection Time: 09/05/21 12:23 AM   Result Value Ref Range    GGT 31 0 - 40 U/L   TSH with free T4 reflex    Collection Time: 09/05/21 12:23 AM   Result Value Ref Range    TSH 2.40 0.40 - 4.00 mU/L   Asymptomatic COVID-19 Virus (Coronavirus) by PCR Nasopharyngeal    Collection Time: 09/05/21 12:28 AM    Specimen: Nasopharyngeal; Swab   Result Value Ref Range    SARS CoV2 PCR Negative Negative   HCG qualitative urine (UPT)    Collection Time: 09/05/21  3:34 AM   Result Value Ref Range    hCG Urine Qualitative Negative Negative   Drug abuse screen 1 urine (ED)    Collection Time: 09/05/21  3:34 AM   Result Value Ref Range    Amphetamines Urine Screen Negative  Screen Negative    Barbiturates Urine Screen Negative Screen Negative    Benzodiazepines Urine Screen Negative Screen Negative    Cannabinoids Urine Screen Negative Screen Negative    Cocaine Urine Screen Negative Screen Negative    Opiates Urine Screen Negative Screen Negative   UA with Microscopic reflex to Culture    Collection Time: 09/05/21  3:34 AM    Specimen: Urine, NOS   Result Value Ref Range    Color Urine Light Yellow Colorless, Straw, Light Yellow, Yellow    Appearance Urine Clear Clear    Glucose Urine Negative Negative mg/dL    Bilirubin Urine Negative Negative    Ketones Urine Negative Negative mg/dL    Specific Gravity Urine 1.017 1.003 - 1.035    Blood Urine Negative Negative    pH Urine 5.0 5.0 - 7.0    Protein Albumin Urine Negative Negative mg/dL    Urobilinogen Urine Normal Normal, 2.0 mg/dL    Nitrite Urine Negative Negative    Leukocyte Esterase Urine Negative Negative    Mucus Urine Present (A) None Seen /LPF    RBC Urine <1 <=2 /HPF    WBC Urine 1 <=5 /HPF    Squamous Epithelials Urine <1 <=1 /HPF         Recent Results (from the past 240 hour(s))   Alcohol breath test POCT    Collection Time: 09/04/21  9:42 PM   Result Value Ref Range    Alcohol Breath Test 0.118 (A) 0.00 - 0.01   Comprehensive metabolic panel    Collection Time: 09/05/21 12:23 AM   Result Value Ref Range    Sodium 141 133 - 144 mmol/L    Potassium 3.5 3.4 - 5.3 mmol/L    Chloride 113 (H) 94 - 109 mmol/L    Carbon Dioxide (CO2) 23 20 - 32 mmol/L    Anion Gap 5 3 - 14 mmol/L    Urea Nitrogen 13 7 - 30 mg/dL    Creatinine 0.67 0.52 - 1.04 mg/dL    Calcium 7.7 (L) 8.5 - 10.1 mg/dL    Glucose 93 70 - 99 mg/dL    Alkaline Phosphatase 64 40 - 150 U/L     (H) 0 - 45 U/L    ALT 48 0 - 50 U/L    Protein Total 6.9 6.8 - 8.8 g/dL    Albumin 3.5 3.4 - 5.0 g/dL    Bilirubin Total 0.2 0.2 - 1.3 mg/dL    GFR Estimate >90 >60 mL/min/1.73m2   CBC with platelets and differential    Collection Time: 09/05/21 12:23 AM   Result Value  Ref Range    WBC Count 6.7 4.0 - 11.0 10e3/uL    RBC Count 4.32 3.80 - 5.20 10e6/uL    Hemoglobin 12.8 11.7 - 15.7 g/dL    Hematocrit 37.9 35.0 - 47.0 %    MCV 88 78 - 100 fL    MCH 29.6 26.5 - 33.0 pg    MCHC 33.8 31.5 - 36.5 g/dL    RDW 12.7 10.0 - 15.0 %    Platelet Count 312 150 - 450 10e3/uL    % Neutrophils 58 %    % Lymphocytes 31 %    % Monocytes 7 %    % Eosinophils 3 %    % Basophils 1 %    % Immature Granulocytes 0 %    NRBCs per 100 WBC 0 <1 /100    Absolute Neutrophils 3.9 1.6 - 8.3 10e3/uL    Absolute Lymphocytes 2.1 0.8 - 5.3 10e3/uL    Absolute Monocytes 0.5 0.0 - 1.3 10e3/uL    Absolute Eosinophils 0.2 0.0 - 0.7 10e3/uL    Absolute Basophils 0.0 0.0 - 0.2 10e3/uL    Absolute Immature Granulocytes 0.0 <=0.0 10e3/uL    Absolute NRBCs 0.0 10e3/uL   GGT    Collection Time: 09/05/21 12:23 AM   Result Value Ref Range    GGT 31 0 - 40 U/L   TSH with free T4 reflex    Collection Time: 09/05/21 12:23 AM   Result Value Ref Range    TSH 2.40 0.40 - 4.00 mU/L   Asymptomatic COVID-19 Virus (Coronavirus) by PCR Nasopharyngeal    Collection Time: 09/05/21 12:28 AM    Specimen: Nasopharyngeal; Swab   Result Value Ref Range    SARS CoV2 PCR Negative Negative   HCG qualitative urine (UPT)    Collection Time: 09/05/21  3:34 AM   Result Value Ref Range    hCG Urine Qualitative Negative Negative   Drug abuse screen 1 urine (ED)    Collection Time: 09/05/21  3:34 AM   Result Value Ref Range    Amphetamines Urine Screen Negative Screen Negative    Barbiturates Urine Screen Negative Screen Negative    Benzodiazepines Urine Screen Negative Screen Negative    Cannabinoids Urine Screen Negative Screen Negative    Cocaine Urine Screen Negative Screen Negative    Opiates Urine Screen Negative Screen Negative   UA with Microscopic reflex to Culture    Collection Time: 09/05/21  3:34 AM    Specimen: Urine, NOS   Result Value Ref Range    Color Urine Light Yellow Colorless, Straw, Light Yellow, Yellow    Appearance Urine Clear  Clear    Glucose Urine Negative Negative mg/dL    Bilirubin Urine Negative Negative    Ketones Urine Negative Negative mg/dL    Specific Gravity Urine 1.017 1.003 - 1.035    Blood Urine Negative Negative    pH Urine 5.0 5.0 - 7.0    Protein Albumin Urine Negative Negative mg/dL    Urobilinogen Urine Normal Normal, 2.0 mg/dL    Nitrite Urine Negative Negative    Leukocyte Esterase Urine Negative Negative    Mucus Urine Present (A) None Seen /LPF    RBC Urine <1 <=2 /HPF    WBC Urine 1 <=5 /HPF    Squamous Epithelials Urine <1 <=1 /HPF            Because this patient meets criteria for an Alcohol Use Disorder, I performed the following brief intervention on the date of this note:              1) Expressed concern that the patient is drinking at unhealthy levels known to increase their risk of alcohol related problems              2) Gave feedback linking alcohol use and health, including personalized feedback explaining how alcohol use can interact with their medical and/or psychiatric problems, and with prescribed medications.              3) Advised patient to abstain.        Discussed with patient many issues of addiction,triggers, relapse, and establishing a solid recovery program.    DISCHARGE MENTAL STATUS EXAMINATION:  The patient is alert, oriented x3.  Good fund of knowledge.  Good use of language.  Recent and remote memory, language, fund of knowledge are all adequate.  Euthymic mood congruent affect  Speech normal rate/rhythm linear tp no loose asso,The patient does not have any active suicidal or homicidal ideation.  Does not have any auditory or visual hallucination.  Fair insight/judgment At this time, the patient was stable to be discharged.        Pt was not determined to not be a danger to himself or others. At the current time of discharge, the patient does not meet criteria for involuntary hospitalization. On the day of discharge, the patient reports that they do not have suicidal or homicidal  ideation and would never hurt themselves or others. Steps taken to minimize risk include: assessing patient s behavior and thought process daily during hospital stay, discharging patient with adequate plan for follow up for mental and physical health and discussing safety plan of returning to the hospital should the patient ever have thoughts of harming themselves or others. Therefore, based on all available evidence including the factors cited above, the patient does not appear to be at imminent risk for self-harm, and is appropriate for outpatient level of care.     Educated about side effects/risk vs benefits /alternative including non treatment.Pt consented to be on medication.     .Total time spent on discharge summary more than 35 min  More than  20 min  planning, coordination of care, medication reconciliation and performance of physical exam on day of discharge.Care was coordinated with unit RN and unit therapist       Viviana Massey Medication Instructions LINDEN:[id number]    Printed on:09/05/21 1024   Medication Information                      buPROPion (WELLBUTRIN XL) 300 MG 24 hr tablet  Take 1 tablet (300 mg) by mouth every morning             disulfiram (ANTABUSE) 250 MG tablet  Take 1 tablet (250 mg) by mouth daily             gabapentin (NEURONTIN) 600 MG tablet  Take 1 tablet (600 mg) by mouth 3 times daily             hydrOXYzine (ATARAX) 50 MG tablet  Take 1 tablet (50 mg) by mouth 3 times daily as needed for anxiety             multivitamin w/minerals (THERA-VIT-M) tablet  Take 1 tablet by mouth daily             omeprazole (PRILOSEC) 20 MG DR capsule  Take 1 capsule (20 mg) by mouth 2 times daily             prazosin (MINIPRESS) 1 MG capsule  Take 1 capsule (1 mg) by mouth At Bedtime             vitamin B1 (THIAMINE) 100 MG tablet  Take 1 tablet (100 mg) by mouth daily                  Disposition: Home  During the hospitalization patient wanted to leave she [id number] intent to  "leave.  She is not holdable she is not suicidal she is not homicidal  She is not out of detox she will be discharged AGAINST MEDICAL ADVICE her prognosis is guarded if she relapses     Facts about COVID19 at www.cdc.gov/COVID19 and www.[place].gov/covid19     Keeping hands clean is one of the most important steps we can take to avoid getting sick and spreading germs to others.  Please wash your hands frequently and lather with soap for at least 20 seconds!             \"Much or all of the text in this note was generated through the use of Dragon Dictate voice to text software. Errors in spelling or words which appear to be out of contact are unintentional, may be present due having escaped editing\"     "

## 2023-02-23 NOTE — ED AVS SNAPSHOT
[facility], Emergency Department    [address]    [place] [address]    Phone:  [phone]    Fax:  [phone]                                       Viviana Massey   MRN: [id number]    Department:  [facility], Emergency Department   Date of Visit:  12/23/2017           Patient Information     Date Of Birth          1980        Your diagnoses for this visit were:     Uncomplicated alcohol dependence (H)     Benzodiazepine dependence (H)        You were seen by Tila Tam MD.      24 Hour Appointment Hotline       To make an appointment at any [facility], call [phone] ([phone]). If you don't have a family doctor or clinic, we will help you find one. [facility] clinics are conveniently located to serve the needs of you and your family.             Review of your medicines      Notice     You have not been prescribed any medications.            Procedures and tests performed during your visit     Alcohol breath test POCT      Orders Needing Specimen Collection     Ordered          12/23/17 1219  Drug abuse screen 6 urine (chem dep) - ROUTINE, Prio: Routine, Needs to be Collected     Scheduled Task Status   12/23/17 1220 Collect Drug abuse screen 6 urine (chem dep) Open   Order Class:  PCU Collect                12/23/17 1229  HCG qualitative urine - STAT, Prio: STAT, Needs to be Collected     Scheduled Task Status   12/23/17 1229 Collect HCG qualitative urine Open   Order Class:  PCU Collect                  Pending Results     No orders found from 12/21/2017 to 12/24/2017.            Pending Culture Results     No orders found from 12/21/2017 to 12/24/2017.            Pending Results Instructions     If you had any lab results that were not finalized at the time of your Discharge, you can call the ED Lab Result RN at [phone]. You will be contacted by this team for any positive Lab results or changes in treatment. The nurses are available 7 days a week from 10A to  6:30P.  You can leave a message 24 hours per day and they will return your call.        Thank you for choosing [facility]       Thank you for choosing [facility] for your care. Our goal is always to provide you with excellent care. Hearing back from our patients is one way we can continue to improve our services. Please take a few minutes to complete the written survey that you may receive in the mail after you visit with us. Thank you!        [employer] Information     [employer] lets you send messages to your doctor, view your test results, renew your prescriptions, schedule appointments and more. To sign up, go to [email or web address] . Click on \"Log in\" on the left side of the screen, which will take you to the Welcome page. Then click on \"Sign up Now\" on the right side of the page.     You will be asked to enter the access code listed below, as well as some personal information. Please follow the directions to create your username and password.     Your access code is: [email or web address]  Expires: 3/23/2018  1:13 PM     Your access code will  in 90 days. If you need help or a new code, please call your [facility] clinic or [phone].        Care EveryWhere ID     This is your Care EveryWhere ID. This could be used by other organizations to access your [facility] medical records  [id number]        Equal Access to Services     JOSEFA TESFAYE AH: Hadedin Clark, waaxda luqadaha, qaybta kaalmada david, erasto quiroga. So [facility] [phone].    ATENCIÓN: Si habla español, tiene a aviles disposición servicios gratuitos de asistencia lingüística. Llame al [phone].    We comply with applicable federal civil rights laws and [place] laws. We do not discriminate on the basis of race, color, national origin, age, disability, sex, sexual orientation, or gender identity.            After Visit Summary       This is your record. Keep this with you and show to your community pharmacist(s) " and doctor(s) at your next visit.